# Patient Record
Sex: FEMALE | Race: WHITE | NOT HISPANIC OR LATINO | URBAN - METROPOLITAN AREA
[De-identification: names, ages, dates, MRNs, and addresses within clinical notes are randomized per-mention and may not be internally consistent; named-entity substitution may affect disease eponyms.]

---

## 2020-02-28 ENCOUNTER — OFFICE VISIT (OUTPATIENT)
Dept: FAMILY MEDICINE | Facility: CLINIC | Age: 30
End: 2020-02-28

## 2020-02-28 VITALS
SYSTOLIC BLOOD PRESSURE: 124 MMHG | TEMPERATURE: 97.5 F | WEIGHT: 130 LBS | HEART RATE: 86 BPM | OXYGEN SATURATION: 99 % | HEIGHT: 67 IN | BODY MASS INDEX: 20.4 KG/M2 | DIASTOLIC BLOOD PRESSURE: 81 MMHG

## 2020-02-28 DIAGNOSIS — R35.0 INCREASED FREQUENCY OF URINATION: ICD-10-CM

## 2020-02-28 DIAGNOSIS — B96.89 BV (BACTERIAL VAGINOSIS): ICD-10-CM

## 2020-02-28 DIAGNOSIS — N76.0 BV (BACTERIAL VAGINOSIS): ICD-10-CM

## 2020-02-28 DIAGNOSIS — R30.0 DYSURIA: Primary | ICD-10-CM

## 2020-02-28 LAB
ALBUMIN UR-MCNC: NEGATIVE MG/DL
APPEARANCE UR: CLEAR
BACTERIA #/AREA URNS HPF: ABNORMAL /HPF
BILIRUB UR QL STRIP: NEGATIVE
COLOR UR AUTO: YELLOW
GLUCOSE UR STRIP-MCNC: NEGATIVE MG/DL
HGB UR QL STRIP: NEGATIVE
KETONES UR STRIP-MCNC: NEGATIVE MG/DL
LEUKOCYTE ESTERASE UR QL STRIP: NEGATIVE
NITRATE UR QL: NEGATIVE
NON-SQ EPI CELLS #/AREA URNS LPF: ABNORMAL /LPF
PH UR STRIP: 5.5 PH (ref 5–7)
RBC #/AREA URNS AUTO: ABNORMAL /HPF
SOURCE: ABNORMAL
SP GR UR STRIP: >1.03 (ref 1–1.03)
SPECIMEN SOURCE: ABNORMAL
UROBILINOGEN UR STRIP-ACNC: 0.2 EU/DL (ref 0.2–1)
WBC #/AREA URNS AUTO: ABNORMAL /HPF
WET PREP SPEC: ABNORMAL

## 2020-02-28 PROCEDURE — 99203 OFFICE O/P NEW LOW 30 MIN: CPT | Performed by: NURSE PRACTITIONER

## 2020-02-28 PROCEDURE — 81001 URINALYSIS AUTO W/SCOPE: CPT | Performed by: NURSE PRACTITIONER

## 2020-02-28 PROCEDURE — 87210 SMEAR WET MOUNT SALINE/INK: CPT | Performed by: NURSE PRACTITIONER

## 2020-02-28 RX ORDER — BUPROPION HYDROCHLORIDE 300 MG/1
TABLET ORAL
COMMUNITY
Start: 2020-02-07 | End: 2022-04-12

## 2020-02-28 RX ORDER — LAMOTRIGINE 150 MG/1
TABLET ORAL
COMMUNITY
Start: 2020-02-07 | End: 2022-04-12

## 2020-02-28 RX ORDER — METRONIDAZOLE 7.5 MG/G
GEL VAGINAL
Qty: 30 G | Refills: 0 | Status: SHIPPED | OUTPATIENT
Start: 2020-02-28 | End: 2022-04-12

## 2020-02-28 RX ORDER — METRONIDAZOLE 500 MG/1
500 TABLET ORAL 2 TIMES DAILY
Qty: 14 TABLET | Refills: 0 | Status: SHIPPED | OUTPATIENT
Start: 2020-02-28 | End: 2020-03-06

## 2020-02-28 ASSESSMENT — MIFFLIN-ST. JEOR: SCORE: 1334.37

## 2020-02-28 NOTE — LETTER
[address]  [address]  [place]  [address]  Tel: [phone]    February 28, 2020    Mariana Olguin  [address] [facility] [address]        Dear Ms. Olguin,    Mariana , these are the results we discussed today.    If you have any further questions or problems, please contact our office.      Sincerely,    Omayra Dinh NP/ Denise Salazar, CMA  Results for orders placed or performed in visit on 02/28/20   UA with Microscopic reflex to Culture     Status: Abnormal   Result Value Ref Range    Color Urine Yellow     Appearance Urine Clear     Glucose Urine Negative NEG^Negative mg/dL    Bilirubin Urine Negative NEG^Negative    Ketones Urine Negative NEG^Negative mg/dL    Specific Gravity Urine >1.030 1.003 - 1.035    pH Urine 5.5 5.0 - 7.0 pH    Protein Albumin Urine Negative NEG^Negative mg/dL    Urobilinogen Urine 0.2 0.2 - 1.0 EU/dL    Nitrite Urine Negative NEG^Negative    Blood Urine Negative NEG^Negative    Leukocyte Esterase Urine Negative NEG^Negative    Source Midstream Urine     WBC Urine 0 - 5 OTO5^0 - 5 /HPF    RBC Urine O - 2 OTO2^O - 2 /HPF    Squamous Epithelial /LPF Urine Moderate (A) FEW^Few /LPF    Bacteria Urine Few (A) NEG^Negative /HPF   Wet prep     Status: Abnormal   Result Value Ref Range    Specimen Description Vagina     Wet Prep No Trichomonas seen     Wet Prep No yeast seen     Wet Prep Rare  Clue cells seen   (A)     Wet Prep No WBC's seen                Enclosure: Lab Results

## 2021-03-19 ENCOUNTER — OFFICE VISIT (OUTPATIENT)
Dept: FAMILY MEDICINE | Facility: CLINIC | Age: 31
End: 2021-03-19
Payer: COMMERCIAL

## 2021-03-19 VITALS
HEART RATE: 88 BPM | SYSTOLIC BLOOD PRESSURE: 121 MMHG | WEIGHT: 115.5 LBS | TEMPERATURE: 97.3 F | DIASTOLIC BLOOD PRESSURE: 84 MMHG | BODY MASS INDEX: 18.13 KG/M2 | OXYGEN SATURATION: 98 % | RESPIRATION RATE: 15 BRPM | HEIGHT: 67 IN

## 2021-03-19 DIAGNOSIS — F41.9 ANXIETY AND DEPRESSION: ICD-10-CM

## 2021-03-19 DIAGNOSIS — R19.7 DIARRHEA, UNSPECIFIED TYPE: ICD-10-CM

## 2021-03-19 DIAGNOSIS — Z97.5 IUD (INTRAUTERINE DEVICE) IN PLACE: ICD-10-CM

## 2021-03-19 DIAGNOSIS — R11.2 NAUSEA AND VOMITING, INTRACTABILITY OF VOMITING NOT SPECIFIED, UNSPECIFIED VOMITING TYPE: ICD-10-CM

## 2021-03-19 DIAGNOSIS — F32.A ANXIETY AND DEPRESSION: ICD-10-CM

## 2021-03-19 DIAGNOSIS — Z00.00 ROUTINE GENERAL MEDICAL EXAMINATION AT A HEALTH CARE FACILITY: Primary | ICD-10-CM

## 2021-03-19 DIAGNOSIS — Z13.220 LIPID SCREENING: ICD-10-CM

## 2021-03-19 LAB
ALBUMIN SERPL-MCNC: 3.6 G/DL (ref 3.3–4.6)
ALP SERPL-CCNC: 54 U/L (ref 40–150)
ALT SERPL-CCNC: 21 U/L (ref 0–50)
AST SERPL-CCNC: 25 U/L (ref 0–45)
BASOPHILS # BLD AUTO: 0.1 10E9/L (ref 0–0.2)
BASOPHILS NFR BLD AUTO: 0.7 %
BILIRUB SERPL-MCNC: 0.7 MG/DL (ref 0.2–1.3)
BUN SERPL-MCNC: 7 MG/DL (ref 5–24)
CALCIUM SERPL-MCNC: 9.5 MG/DL (ref 8.5–10.4)
CHLORIDE SERPLBLD-SCNC: 102 MMOL/L (ref 94–109)
CHOLEST SERPL-MCNC: 201 MG/DL (ref 0–200)
CHOLEST/HDLC SERPL: 3.7 {RATIO} (ref 0–5)
CO2 SERPL-SCNC: 30 MMOL/L (ref 20–32)
CREAT SERPL-MCNC: 0.8 MG/DL (ref 0.6–1.3)
DIFFERENTIAL METHOD BLD: ABNORMAL
EGFR CALCULATED (BLACK REFERENCE): 107.6
EGFR CALCULATED (NON BLACK REFERENCE): 88.9
EOSINOPHIL # BLD AUTO: 0.4 10E9/L (ref 0–0.7)
EOSINOPHIL NFR BLD AUTO: 3.9 %
ERYTHROCYTE [DISTWIDTH] IN BLOOD BY AUTOMATED COUNT: 12.3 % (ref 10–15)
FASTING SPECIMEN: NO
GLUCOSE SERPL-MCNC: 90 MG/DL (ref 60–99)
HCT VFR BLD AUTO: 44.2 % (ref 35–47)
HDLC SERPL-MCNC: 55 MG/DL
HGB BLD-MCNC: 15 G/DL (ref 11.7–15.7)
IMM GRANULOCYTES # BLD: 0.1 10E9/L (ref 0–0.4)
IMM GRANULOCYTES NFR BLD: 0.7 %
LDLC SERPL CALC-MCNC: 133 MG/DL (ref 0–129)
LYMPHOCYTES # BLD AUTO: 3.2 10E9/L (ref 0.8–5.3)
LYMPHOCYTES NFR BLD AUTO: 29.9 %
MCH RBC QN AUTO: 33.1 PG (ref 26.5–33)
MCHC RBC AUTO-ENTMCNC: 33.9 G/DL (ref 31.5–36.5)
MCV RBC AUTO: 98 FL (ref 78–100)
MONOCYTES # BLD AUTO: 0.7 10E9/L (ref 0–1.3)
MONOCYTES NFR BLD AUTO: 6.5 %
NEUTROPHILS # BLD AUTO: 6.2 10E9/L (ref 1.6–8.3)
NEUTROPHILS NFR BLD AUTO: 58.3 %
NRBC # BLD AUTO: 0 10*3/UL
NRBC BLD AUTO-RTO: 0 /100
PLATELET # BLD AUTO: 435 10E9/L (ref 150–450)
POTASSIUM SERPL-SCNC: 4.7 MMOL/L (ref 3.4–5.3)
PROT SERPL-MCNC: 7.1 G/DL (ref 6.8–8.8)
RBC # BLD AUTO: 4.53 10E12/L (ref 3.8–5.2)
SODIUM SERPL-SCNC: 143 MMOL/L (ref 137.3–146.3)
TRIGL SERPL-MCNC: 66 MG/DL (ref 0–150)
VLDL-CHOLESTEROL: 13 (ref 7–32)
WBC # BLD AUTO: 10.6 10E9/L (ref 4–11)

## 2021-03-19 RX ORDER — LAMOTRIGINE 150 MG/1
150 TABLET ORAL DAILY
COMMUNITY

## 2021-03-19 RX ORDER — BUPROPION HYDROCHLORIDE 300 MG/1
300 TABLET ORAL EVERY MORNING
COMMUNITY

## 2021-03-19 SDOH — HEALTH STABILITY: MENTAL HEALTH: HOW OFTEN DO YOU HAVE A DRINK CONTAINING ALCOHOL?: 2-4 TIMES A MONTH

## 2021-03-19 SDOH — HEALTH STABILITY: MENTAL HEALTH: HOW MANY STANDARD DRINKS CONTAINING ALCOHOL DO YOU HAVE ON A TYPICAL DAY?: 1 OR 2

## 2021-03-19 SDOH — HEALTH STABILITY: MENTAL HEALTH: HOW OFTEN DO YOU HAVE 6 OR MORE DRINKS ON ONE OCCASION?: NOT ASKED

## 2021-03-19 ASSESSMENT — PATIENT HEALTH QUESTIONNAIRE - PHQ9
5. POOR APPETITE OR OVEREATING: NOT AT ALL
SUM OF ALL RESPONSES TO PHQ QUESTIONS 1-9: 3

## 2021-03-19 ASSESSMENT — ANXIETY QUESTIONNAIRES
3. WORRYING TOO MUCH ABOUT DIFFERENT THINGS: NOT AT ALL
1. FEELING NERVOUS, ANXIOUS, OR ON EDGE: SEVERAL DAYS
7. FEELING AFRAID AS IF SOMETHING AWFUL MIGHT HAPPEN: NOT AT ALL
5. BEING SO RESTLESS THAT IT IS HARD TO SIT STILL: NOT AT ALL
GAD7 TOTAL SCORE: 2
2. NOT BEING ABLE TO STOP OR CONTROL WORRYING: SEVERAL DAYS
6. BECOMING EASILY ANNOYED OR IRRITABLE: NOT AT ALL
IF YOU CHECKED OFF ANY PROBLEMS ON THIS QUESTIONNAIRE, HOW DIFFICULT HAVE THESE PROBLEMS MADE IT FOR YOU TO DO YOUR WORK, TAKE CARE OF THINGS AT HOME, OR GET ALONG WITH OTHER PEOPLE: SOMEWHAT DIFFICULT

## 2021-03-19 ASSESSMENT — MIFFLIN-ST. JEOR: SCORE: 1266.03

## 2021-03-19 NOTE — LETTER
March 23, 2021      Mariana Olguin  [facility] [address]        Angela Peña,     Here are the results from your recent labs in clinic. Everything looks good at this point. I have no concerns about your results. Please feel free to contact the clinic or you can reach me more directly through the [employer] portal if you have any questions.     Loco Schroeder MD     Resulted Orders   Lipid Panel ([place])   Result Value Ref Range    FASTING SPECIMEN NO     Cholesterol 201.0 (H) 0.0 - 200.0    HDL Cholesterol 55.0 >50.0    Triglycerides 66.0 0.0 - 150.0    Cholesterol/HDL Ratio 3.7 0.0 - 5.0    LDL Cholesterol Direct 133.0 (H) 0.0 - 129.0    VLDL-Cholesterol 13.0 7.0 - 32.0   Hepatitis C Screen Reflex to HCV RNA Quant and Genotype   Result Value Ref Range    Hepatitis C Antibody Nonreactive NR^Nonreactive      Comment:      Assay performance characteristics have not been established for newborns,   infants, and children     Comprehensive Metabolic Panel ([place])   Result Value Ref Range    Glucose 90.0 60.0 - 99.0 mg/dL    Urea Nitrogen 7.0 5.0 - 24.0 mg/dL    Calcium 9.5 8.5 - 10.4 mg/dL    Creatinine 0.8 0.6 - 1.3 mg/dL    eGFR Calculated (Non Black Reference) 88.9 >60.0    eGFR Calculated (Black Reference) 107.6 >60.0    Sodium 143.0 137.3 - 146.3 mmol/L    Potassium 4.7 3.4 - 5.3 mmol/L    Chloride 102.0 94.0 - 109.0 mmol/L    Carbon Dioxide 30.0 20.0 - 32.0 mmol/L    Albumin 3.6 3.3 - 4.6 g/dL    Alkaline Phosphatase 54.0 40.0 - 150.0 U/L    ALT 21.0 0.0 - 50.0 U/L    AST 25.0 0.0 - 45.0 U/L    Bilirubin Total 0.7 0.2 - 1.3 mg/dL    Protein Total 7.1 6.8 - 8.8 g/dL   HIV Antigen Antibody Combo   Result Value Ref Range    HIV Antigen Antibody Combo Nonreactive NR^Nonreactive          Comment:      HIV-1 p24 Ag & HIV-1/HIV-2 Ab Not Detected   CBC with platelets differential   Result Value Ref Range    WBC 10.6 4.0 - 11.0 10e9/L    RBC Count 4.53 3.8 - 5.2 10e12/L    Hemoglobin 15.0 11.7 -  15.7 g/dL    Hematocrit 44.2 35.0 - 47.0 %    MCV 98 78 - 100 fl    MCH 33.1 (H) 26.5 - 33.0 pg    MCHC 33.9 31.5 - 36.5 g/dL    RDW 12.3 10.0 - 15.0 %    Platelet Count 435 150 - 450 10e9/L    Diff Method Automated Method     % Neutrophils 58.3 %    % Lymphocytes 29.9 %    % Monocytes 6.5 %    % Eosinophils 3.9 %    % Basophils 0.7 %    % Immature Granulocytes 0.7 %    Nucleated RBCs 0 0 /100    Absolute Neutrophil 6.2 1.6 - 8.3 10e9/L    Absolute Lymphocytes 3.2 0.8 - 5.3 10e9/L    Absolute Monocytes 0.7 0.0 - 1.3 10e9/L    Absolute Eosinophils 0.4 0.0 - 0.7 10e9/L    Absolute Basophils 0.1 0.0 - 0.2 10e9/L    Abs Immature Granulocytes 0.1 0 - 0.4 10e9/L    Absolute Nucleated RBC 0.0       No

## 2021-03-19 NOTE — NURSING NOTE
"31 year old  Chief Complaint   Patient presents with     Establish Care     Abdominal Pain     stomach issue, x 6 months,  intermittent weeks of diarrhea, thinks lost about 10 lb, last week vomitting/diarrhea/abd cramping       Blood pressure 121/84, pulse 88, temperature 97.3  F (36.3  C), temperature source Skin, resp. rate 15, height 1.693 m (5' 6.65\"), weight 52.4 kg (115 lb 8 oz), SpO2 98 %. Body mass index is 18.28 kg/m .  There is no problem list on file for this patient.      Wt Readings from Last 2 Encounters:   03/19/21 52.4 kg (115 lb 8 oz)     BP Readings from Last 3 Encounters:   03/19/21 121/84         Current Outpatient Medications   Medication     buPROPion (WELLBUTRIN XL) 300 MG 24 hr tablet     lamoTRIgine (LAMICTAL) 150 MG tablet     levonorgestrel (MIRENA) 20 MCG/24HR IUD     No current facility-administered medications for this visit.        Social History     Tobacco Use     Smoking status: Never Smoker     Smokeless tobacco: Never Used   Substance Use Topics     Alcohol use: Yes     Frequency: 2-4 times a month     Drinks per session: 1 or 2     Drug use: Never       Health Maintenance Due   Topic Date Due     PREVENTIVE CARE VISIT  Never done     ADVANCE CARE PLANNING  Never done     HIV SCREENING  Never done     HEPATITIS C SCREENING  Never done     PAP  Never done     DTAP/TDAP/TD IMMUNIZATION (1 - Tdap) Never done     INFLUENZA VACCINE (1) 09/01/2020       No results found for: PAP      March 19, 2021 1:40 PM    "

## 2021-03-19 NOTE — PROGRESS NOTES
"   SUBJECTIVE:   CC: Mariana Olguin is an 31 year old woman who presents for preventive health visit.     Patient has been advised of split billing requirements and indicates understanding: Yes     Healthy Habits:    Do you get at least three servings of calcium containing foods daily (dairy, green leafy vegetables, etc.)? no, taking calcium and/or vitamin D supplement: yes     Amount of exercise or daily activities, outside of work: 3-4 day(s) per week    Problems taking medications regularly No    Medication side effects: No    Have you had an eye exam in the past two years? yes    Do you see a dentist twice per year? yes    Do you have sleep apnea, excessive snoring or daytime drowsiness?no      PROBLEMS TO ADD ON...  Abdominal discomfort  - off and on for the past 6 months  - diarrhea, cramping would occur for a couple of weeks  - then spontaneously resolve  - more recently episodes are more brief but more frequent, gaining in intensity  - now with associated vomiting  - believes she has lost about 10 pounds without trying over the past 6 months or so.   - says she has a \"consistent diet\" with lots of breads and dairy so she has a very low suspicion for lactose or gluten intolerance (why would symptoms come and go if I'm always eating the same thing?)  - patient does have a history of depression and anxiety (she mentions \"bipolar\") for which she takes Wellbutrin and Lamictal as prescribed by a psychiatrist; she denies any recent med changes or dose adjustments  - denies any fever or chills  - denies any blood in stool  - no urinary issues  - has an IUD in place she thinks \"for about a year now\"  - denies identifiable association with menstruation    Today's PHQ-2 Score:   PHQ-2 ( 1999 [employer]) 3/19/2021   Q1: Little interest or pleasure in doing things 0   Q2: Feeling down, depressed or hopeless 0   PHQ-2 Score 0       Abuse: Current or Past(Physical, Sexual or Emotional)- No  Do you feel safe in your " environment? Yes      Social History     Tobacco Use     Smoking status: Never Smoker     Smokeless tobacco: Never Used   Substance Use Topics     Alcohol use: Yes     Frequency: 2-4 times a month     Drinks per session: 1 or 2     If you drink alcohol do you typically have >3 drinks per day or >7 drinks per week? No                     Reviewed orders with patient.  Reviewed health maintenance and updated orders accordingly - Yes    Breast CA Risk Screening:  Patient under 40 years of age: Routine Mammogram Screening not recommended.     Pertinent mammograms are reviewed under the imaging tab.  History of abnormal Pap smear: NO - age 30-65 PAP every 5 years with negative HPV co-testing recommended     Reviewed and updated as needed this visit by clinical staff  Tobacco  Allergies  Meds  Problems  Med Hx  Surg Hx  Fam Hx          Reviewed and updated as needed this visit by Provider  Tobacco  Allergies  Meds  Problems  Med Hx  Surg Hx  Fam Hx         History reviewed. No pertinent past medical history.   Past Surgical History:   Procedure Laterality Date     DENTAL SURGERY      [place] Teeth       ROS:  CONSTITUTIONAL: NEGATIVE for fever, chills, change in weight  INTEGUMENTARU/SKIN: NEGATIVE for worrisome rashes, moles or lesions  EYES: NEGATIVE for vision changes or irritation  ENT: NEGATIVE for ear, mouth and throat problems  RESP: NEGATIVE for significant cough or SOB  BREAST: NEGATIVE for masses, tenderness or discharge  CV: NEGATIVE for chest pain, palpitations or peripheral edema  GI: NEGATIVE for nausea, abdominal pain, heartburn, or change in bowel habits  : NEGATIVE for unusual urinary or vaginal symptoms. Periods are regular.  MUSCULOSKELETAL: NEGATIVE for significant arthralgias or myalgia  NEURO: NEGATIVE for weakness, dizziness or paresthesias  PSYCHIATRIC: NEGATIVE for changes in mood or affect    OBJECTIVE:   /84 (BP Location: Right arm, Patient Position: Sitting, Cuff Size:  "Adult Regular)   Pulse 88   Temp 97.3  F (36.3  C) (Skin)   Resp 15   Ht 1.693 m (5' 6.65\")   Wt 52.4 kg (115 lb 8 oz)   SpO2 98%   BMI 18.28 kg/m    EXAM:  GENERAL: healthy, alert and no distress  EYES: Eyes grossly normal to inspection, PERRL and conjunctivae and sclerae normal  HENT: ear canals and TM's normal, nose and mouth without ulcers or lesions  NECK: no adenopathy, no asymmetry, masses, or scars and thyroid normal to palpation  RESP: lungs clear to auscultation - no rales, rhonchi or wheezes  BREAST: normal without masses, tenderness or nipple discharge and no palpable axillary masses or adenopathy  CV: regular rate and rhythm, normal S1 S2, no S3 or S4, no murmur, click or rub, no peripheral edema and peripheral pulses strong  ABDOMEN: soft, moderate generalized tenderness, no hepatosplenomegaly, no masses and bowel sounds normal  MS: no gross musculoskeletal defects noted, no edema  SKIN: no suspicious lesions or rashes  NEURO: Normal strength and tone, mentation intact and speech normal  PSYCH: mentation appears normal, affect normal/bright    Diagnostic Test Results:  Labs reviewed in Epic    ASSESSMENT/PLAN:   Mariana was seen today for establish care and abdominal pain.    Diagnoses and all orders for this visit:    Routine general medical examination at a health care facility  -     Hepatitis C Screen Reflex to HCV RNA Quant and Genotype  -     Comprehensive Metabolic Panel ([place])  -     HIV Antigen Antibody Combo    Lipid screening  -     Lipid Panel ([place])    Anxiety and depression    IUD (intrauterine device) in place    Nausea and vomiting, intractability of vomiting not specified, unspecified vomiting type  -     GASTROENTEROLOGY ADULT REF CONSULT ONLY; Future    Diarrhea, unspecified type  -     GASTROENTEROLOGY ADULT REF CONSULT ONLY; Future  -     Cancel: CBC with Diff Plt ([facility])  -     CBC with platelets differential    Unclear diagnosis for abdominal symptoms at this " "point. Considered stool testing for parasites or bacteria but I have low suspicion for this based on history. Could consider this if symptoms worsen. Some consideration for IBD although reassured by the absence of blood in stool. Possible food allergies causing episodic inflammation but patient does not believe this to be an option. She does have a significant history for anxiety and mood disorders and I have some suspicion for IBS factor, although at this point patient feels she is managing her mood very well and does not believe this could be related to anxiety. She very much wishes to follow up with Gastroenterology at this point which I do not think is unreasonable.     Patient has been advised of split billing requirements and indicates understanding: Yes  COUNSELING:   Reviewed preventive health counseling, as reflected in patient instructions    Estimated body mass index is 18.28 kg/m  as calculated from the following:    Height as of this encounter: 1.693 m (5' 6.65\").    Weight as of this encounter: 52.4 kg (115 lb 8 oz).        She reports that she has never smoked. She has never used smokeless tobacco.      Counseling Resources:  ATP IV Guidelines  Pooled Cohorts Equation Calculator  Breast Cancer Risk Calculator  BRCA-Related Cancer Risk Assessment: FHS-7 Tool  FRAX Risk Assessment  ICSI Preventive Guidelines  Dietary Guidelines for Americans, 2010  USDA's MyPlate  ASA Prophylaxis  Lung CA Screening    Loco Puckett MD  [facility]  "

## 2021-03-20 ASSESSMENT — ANXIETY QUESTIONNAIRES: GAD7 TOTAL SCORE: 2

## 2021-03-22 ENCOUNTER — TELEPHONE (OUTPATIENT)
Dept: GASTROENTEROLOGY | Facility: CLINIC | Age: 31
End: 2021-03-22

## 2021-03-22 LAB
HCV AB SERPL QL IA: NONREACTIVE
HIV 1+2 AB+HIV1 P24 AG SERPL QL IA: NONREACTIVE

## 2021-03-22 NOTE — TELEPHONE ENCOUNTER
[facility] Call Center    Phone Message    May a detailed message be left on voicemail: yes     Reason for Call: Other: Per pt has a referral sent over and pt is loosing weight fast. Per pt is currently 115. Writer sending this encounter over for clinic for a patient assessment. Please call pt back. Thank you!     Action Taken: Message routed to:  [facility] ([facility] Gastro    Travel Screening: Not Applicable

## 2021-04-15 ENCOUNTER — TRANSFERRED RECORDS (OUTPATIENT)
Dept: HEALTH INFORMATION MANAGEMENT | Facility: CLINIC | Age: 31
End: 2021-04-15

## 2021-04-15 LAB — COLONOSCOPY: NORMAL

## 2021-04-23 ENCOUNTER — TRANSFERRED RECORDS (OUTPATIENT)
Dept: HEALTH INFORMATION MANAGEMENT | Facility: CLINIC | Age: 31
End: 2021-04-23

## 2021-05-11 ENCOUNTER — PRE VISIT (OUTPATIENT)
Dept: GASTROENTEROLOGY | Facility: CLINIC | Age: 31
End: 2021-05-11

## 2021-05-11 ENCOUNTER — VIRTUAL VISIT (OUTPATIENT)
Dept: GASTROENTEROLOGY | Facility: CLINIC | Age: 31
End: 2021-05-11
Attending: FAMILY MEDICINE
Payer: COMMERCIAL

## 2021-05-11 VITALS — HEIGHT: 67 IN | WEIGHT: 122 LBS | BODY MASS INDEX: 19.15 KG/M2

## 2021-05-11 DIAGNOSIS — R10.84 ABDOMINAL PAIN, GENERALIZED: Primary | ICD-10-CM

## 2021-05-11 DIAGNOSIS — R19.7 DIARRHEA, UNSPECIFIED TYPE: ICD-10-CM

## 2021-05-11 PROCEDURE — 99204 OFFICE O/P NEW MOD 45 MIN: CPT | Mod: GC | Performed by: STUDENT IN AN ORGANIZED HEALTH CARE EDUCATION/TRAINING PROGRAM

## 2021-05-11 RX ORDER — PANTOPRAZOLE SODIUM 40 MG/1
40 TABLET, DELAYED RELEASE ORAL DAILY
Qty: 30 TABLET | Refills: 0 | Status: SHIPPED | OUTPATIENT
Start: 2021-05-11 | End: 2021-06-10

## 2021-05-11 ASSESSMENT — MIFFLIN-ST. JEOR: SCORE: 1293.08

## 2021-05-11 NOTE — PROGRESS NOTES
Mariana Olguin is a 31 year old old who is being evaluated via a billable video visit.      How would you like to obtain your AVS? MyChart  If the video visit is dropped, the invitation should be resent by: Text to cell phone: [phone]  Will anyone else be joining your video visit? No      Kimi Marquez, [facility]  Surgery Clinic

## 2021-05-11 NOTE — PATIENT INSTRUCTIONS
It was a pleasure taking care of you today.  I've included a brief summary of our discussion and care plan from today's visit below.  Please review this information with your primary care provider.  _______________________________________________________________________    My recommendations are summarized as follows:    - We will work to obtain the records from [facility]  - Continue pantoprazole daily for another month  - Continue fiber supplemenation  - Continue NSAID avoidance  - Agree with holding on budesonide for now as you are no longer having diarrhea    Return to GI Clinic as needed   _______________________________________________________________________    Who do I call with any questions after my visit?  Please be in touch if there are any further questions that arise following today's visit.  There are multiple ways to contact your gastroenterology care team.        During business hours, you may reach a Gastroenterology nurse at [phone] and choose option 3.         To schedule or reschedule an appointment, please call [phone].       You can always send a secure message through Topicmarks.  Topicmarks messages are answered by your nurse or doctor typically within 24 hours.  Please allow extra time on weekends and holidays.        For urgent/emergent questions after business hours, you may reach the on-call GI Fellow by contacting the [facility]  at [phone].     How will I get the results of any tests ordered?    You will receive all of your results.  If you have signed up for Topicmarks, any tests ordered at your visit will be available to you after your physician reviews them.  Typically this takes 1-2 weeks.  If there are urgent results that require a change in your care plan, your physician or nurse will call you to discuss the next steps.      What is Topicmarks?  Topicmarks is a secure way for you to access all of your healthcare records from the [facility].  It is a  web based computer program, so you can sign on to it from any location.  It also allows you to send secure messages to your care team.  I recommend signing up for Aceable access if you have not already done so and are comfortable with using a computer.      How to I schedule a follow-up visit?  If you did not schedule a follow-up visit today, please call [phone] to schedule a follow-up office visit.        Sincerely,    Cash Feliciano MD  Fellow  [facility]  Division of Gastroenterology

## 2021-05-11 NOTE — PROGRESS NOTES
Video Start time: 418PM   Video End time: 454PM    GI CLINIC VISIT - NEW PATIENT    CC/REFERRING PROVIDER: Loco Puckett  REASON FOR CONSULTATION: abdominal pain and diarrhea    HPI: 31 year old female with PMH of anxiety/depression, bipolar, presenting to GI clinic for abdominal pain and diarrhea.    Reports off/on stomach issues for the past year, worsened 2 months ago with worsened diarrhea and postprandial abdominal cramping. Was having watery diarrhea, predominantly in AM for a couple hours, then improved during day. Stopped eating so as not to trigger diarrhea. On days with diarrhea, would have up to 5-6 BMs/day. On good days, would have at most 2 BMs/day, formed. No obvious black/bloody stools. Had lost weight, was down to 114 lbs, now back up to 122 lbs. Was eating bland diet. Was taking Advil and aspirin in the recent past, up to 2x/day, now stopped after recent EGD/colonoscopy.      Saw PCP 3/19/2021 with abdominal pain, diarrhea, weight loss with normal CBC, negative HIV, normal CMP.    Went to [facility] - had EGD and colonoscopy. EGD with erythematous mucosa in stomach, otherwise normal. Colon with ulcers in sigmoid colon, possible resolving ischemic colitis. Biopsies returned and showed microscopic colitis. Duodenal biopsies were negative for celiac disease.     Has been taking pantoprazole in AM which has been significantly helping, has been taking for 1 month.   Also taking metamucil with every meal which is helping with abdominal cramping, worse cramping without this.   Went to follow up visit with Emi and was given budesonide, didn't start taking as was worried this may affect her mood after talking with psychiatrist and with GI.   Also given dicyclomine, hasn't needed since not having abdominal pain.     Currently, doing well, only will notice sensitivity to spicy foods and citrus foods and have some GERD with that. Bowel movements have been improved as well over past month, having 2-3 formed  BMs/day.     ROS: 10pt ROS performed and otherwise negative.    PAST MEDICAL HISTORY:  Anxiety  Depression   Bipolar    PREVIOUS ABDOMINAL/GYNECOLOGIC SURGERIES:  Past Surgical History:   Procedure Laterality Date     DENTAL SURGERY      [place] Teeth     PERTINENT MEDICATIONS:  Current Outpatient Medications   Medication Sig Dispense Refill     buPROPion (WELLBUTRIN XL) 300 MG 24 hr tablet Take 300 mg by mouth every morning       buPROPion (WELLBUTRIN XL) 300 MG 24 hr tablet TK 1 T PO QD       lamoTRIgine (LAMICTAL) 150 MG tablet Take 150 mg by mouth daily       lamoTRIgine (LAMICTAL) 150 MG tablet TK 1 T PO D       levonorgestrel (MIRENA) 20 MCG/24HR IUD 1 each by Intrauterine route once       metroNIDAZOLE (METROGEL) 0.75 % vaginal gel Insert one applicatorful nightly for 7 nights 30 g 0     SOCIAL HISTORY:  Smoking: None  EtOH: Minimal EtOH use  Social History     Socioeconomic History     Marital status: Single     Spouse name: Not on file     Number of children: Not on file     Years of education: Not on file     Highest education level: Not on file   Occupational History     Not on file   Social Needs     Financial resource strain: Not on file     Food insecurity     Worry: Not on file     Inability: Not on file     Transportation needs     Medical: Not on file     Non-medical: Not on file   Tobacco Use     Smoking status: Never Smoker     Smokeless tobacco: Never Used   Substance and Sexual Activity     Alcohol use: Yes     Frequency: 2-4 times a month     Drinks per session: 1 or 2     Drug use: Never     Sexual activity: Yes     Partners: Male     Birth control/protection: I.U.D.   Lifestyle     Physical activity     Days per week: Not on file     Minutes per session: Not on file     Stress: Not on file   Relationships     Social connections     Talks on phone: Not on file     Gets together: Not on file     Attends Christian service: Not on file     Active member of club or organization: Not on file      Attends meetings of clubs or organizations: Not on file     Relationship status: Not on file     Intimate partner violence     Fear of current or ex partner: Not on file     Emotionally abused: Not on file     Physically abused: Not on file     Forced sexual activity: Not on file   Other Topics Concern     Not on file   Social History Narrative    ** Merged History Encounter **          FAMILY HISTORY:  No colon cancer  Family History   Problem Relation Age of Onset     Hypertension Father      Breast Cancer Paternal Grandmother      PHYSICAL EXAMINATION:  Video physical exam  General: Patient appears well in no acute distress.   Skin: No visualized rash or lesions on visualized skin  Eyes: EOMI, no erythema, sclera icterus or discharge noted  Resp: Appears to be breathing comfortably without accessory muscle usage, speaking in full sentences, no cough  MSK: Appears to have normal range of motion based on visualized movements  Neurologic: No apparent tremors, facial movements symmetric  Psych: affect normal, alert and oriented    The rest of a comprehensive physical examination is deferred due to PHE (public health emergency) video restrictions    PERTINENT STUDIES Reviewed in EMR    ASSESSMENT/PLAN:  31 year old female with PMH of anxiety/depression, bipolar, presenting to GI clinic for abdominal pain and diarrhea. Pain and diarrhea worsened 2 months ago, largely postprandial, in setting of NSAID use. EGD/colon with erythematous mucosa in stomach, sigmoid ulcers and biopsies with MC, all of which may have been triggered by NSAID use. Now improving with NSAID cessation, PPI, and fiber supplementation. May be a component of superimposed IBS as well. Reportedly negative biopsies for celiac.     Plan:  - Obtain records from [facility] from EGD/colon, pathology  - Continue PPI daily for total of 2 months, then trial off PPI  - Continue fiber supplemenation  - Continue NSAID avoidance  - Agree with holding on budesonide  for now as she is no longer having diarrhea    RTC PRN    Thank you for this consultation. It was a pleasure to participate in the care of this patient; please contact us with any further questions.    Seen and discussed with Dr. Perez    This note was created with voice recognition software, and while reviewed for accuracy, typos may remain.     Cash Feliciano MD  GI Fellow  p [phone]

## 2021-05-11 NOTE — LETTER
5/11/2021         RE: Mariana Olguin  [address] [facility] [address]        Dear Colleague,    Thank you for referring your patient, Mariana Olguin, to the [facility] [place]. Please see a copy of my visit note below.    Video Start time: 418PM   Video End time: 454PM    GI CLINIC VISIT - NEW PATIENT    CC/REFERRING PROVIDER: Loco Puckett  REASON FOR CONSULTATION: abdominal pain and diarrhea    HPI: 31 year old female with PMH of anxiety/depression, bipolar, presenting to GI clinic for abdominal pain and diarrhea.    Reports off/on stomach issues for the past year, worsened 2 months ago with worsened diarrhea and postprandial abdominal cramping. Was having watery diarrhea, predominantly in AM for a couple hours, then improved during day. Stopped eating so as not to trigger diarrhea. On days with diarrhea, would have up to 5-6 BMs/day. On good days, would have at most 2 BMs/day, formed. No obvious black/bloody stools. Had lost weight, was down to 114 lbs, now back up to 122 lbs. Was eating bland diet. Was taking Advil and aspirin in the recent past, up to 2x/day, now stopped after recent EGD/colonoscopy.      Saw PCP 3/19/2021 with abdominal pain, diarrhea, weight loss with normal CBC, negative HIV, normal CMP.    Went to [facility] - had EGD and colonoscopy. EGD with erythematous mucosa in stomach, otherwise normal. Colon with ulcers in sigmoid colon, possible resolving ischemic colitis. Biopsies returned and showed microscopic colitis. Duodenal biopsies were negative for celiac disease.     Has been taking pantoprazole in AM which has been significantly helping, has been taking for 1 month.   Also taking metamucil with every meal which is helping with abdominal cramping, worse cramping without this.   Went to follow up visit with [facility] and was given budesonide, didn't start taking as was worried this may affect her mood after talking with psychiatrist and with GI.    Also given dicyclomine, hasn't needed since not having abdominal pain.     Currently, doing well, only will notice sensitivity to spicy foods and citrus foods and have some GERD with that. Bowel movements have been improved as well over past month, having 2-3 formed BMs/day.     ROS: 10pt ROS performed and otherwise negative.    PAST MEDICAL HISTORY:  Anxiety  Depression   Bipolar    PREVIOUS ABDOMINAL/GYNECOLOGIC SURGERIES:  Past Surgical History:   Procedure Laterality Date     DENTAL SURGERY      [place] Teeth     PERTINENT MEDICATIONS:  Current Outpatient Medications   Medication Sig Dispense Refill     buPROPion (WELLBUTRIN XL) 300 MG 24 hr tablet Take 300 mg by mouth every morning       buPROPion (WELLBUTRIN XL) 300 MG 24 hr tablet TK 1 T PO QD       lamoTRIgine (LAMICTAL) 150 MG tablet Take 150 mg by mouth daily       lamoTRIgine (LAMICTAL) 150 MG tablet TK 1 T PO D       levonorgestrel (MIRENA) 20 MCG/24HR IUD 1 each by Intrauterine route once       metroNIDAZOLE (METROGEL) 0.75 % vaginal gel Insert one applicatorful nightly for 7 nights 30 g 0     SOCIAL HISTORY:  Smoking: None  EtOH: Minimal EtOH use  Social History     Socioeconomic History     Marital status: Single     Spouse name: Not on file     Number of children: Not on file     Years of education: Not on file     Highest education level: Not on file   Occupational History     Not on file   Social Needs     Financial resource strain: Not on file     Food insecurity     Worry: Not on file     Inability: Not on file     Transportation needs     Medical: Not on file     Non-medical: Not on file   Tobacco Use     Smoking status: Never Smoker     Smokeless tobacco: Never Used   Substance and Sexual Activity     Alcohol use: Yes     Frequency: 2-4 times a month     Drinks per session: 1 or 2     Drug use: Never     Sexual activity: Yes     Partners: Male     Birth control/protection: I.U.D.   Lifestyle     Physical activity     Days per week: Not on file      Minutes per session: Not on file     Stress: Not on file   Relationships     Social connections     Talks on phone: Not on file     Gets together: Not on file     Attends Latter day service: Not on file     Active member of club or organization: Not on file     Attends meetings of clubs or organizations: Not on file     Relationship status: Not on file     Intimate partner violence     Fear of current or ex partner: Not on file     Emotionally abused: Not on file     Physically abused: Not on file     Forced sexual activity: Not on file   Other Topics Concern     Not on file   Social History Narrative    ** Merged History Encounter **          FAMILY HISTORY:  No colon cancer  Family History   Problem Relation Age of Onset     Hypertension Father      Breast Cancer Paternal Grandmother      PHYSICAL EXAMINATION:  Video physical exam  General: Patient appears well in no acute distress.   Skin: No visualized rash or lesions on visualized skin  Eyes: EOMI, no erythema, sclera icterus or discharge noted  Resp: Appears to be breathing comfortably without accessory muscle usage, speaking in full sentences, no cough  MSK: Appears to have normal range of motion based on visualized movements  Neurologic: No apparent tremors, facial movements symmetric  Psych: affect normal, alert and oriented    The rest of a comprehensive physical examination is deferred due to PHE (public health emergency) video restrictions    PERTINENT STUDIES Reviewed in EMR    ASSESSMENT/PLAN:  31 year old female with PMH of anxiety/depression, bipolar, presenting to GI clinic for abdominal pain and diarrhea. Pain and diarrhea worsened 2 months ago, largely postprandial, in setting of NSAID use. EGD/colon with erythematous mucosa in stomach, sigmoid ulcers and biopsies with MC, all of which may have been triggered by NSAID use. Now improving with NSAID cessation, PPI, and fiber supplementation. May be a component of superimposed IBS as well.  Reportedly negative biopsies for celiac.     Plan:  - Obtain records from [facility] from EGD/colon, pathology  - Continue PPI daily for total of 2 months, then trial off PPI  - Continue fiber supplemenation  - Continue NSAID avoidance  - Agree with holding on budesonide for now as she is no longer having diarrhea    RTC PRN    Thank you for this consultation. It was a pleasure to participate in the care of this patient; please contact us with any further questions.    Seen and discussed with Dr. Perez    This note was created with voice recognition software, and while reviewed for accuracy, typos may remain.     Cash Feliciano MD  GI Fellow  p [phone]      Mariana Olguin is a 31 year old old who is being evaluated via a billable video visit.      How would you like to obtain your AVS? MyChart  If the video visit is dropped, the invitation should be resent by: Text to cell phone: [phone]  Will anyone else be joining your video visit? No      Kimi Marquez, EMT  Surgery Clinic

## 2021-05-11 NOTE — NURSING NOTE
"Chief Complaint   Patient presents with     Consult     Abdominal pain, diarrhea ,and weight loss.       Vitals:    05/11/21 1603   Weight: 122 lb   Height: 5' 6.5\"       Body mass index is 19.4 kg/m .      Kimi Marquez, EMT  Surgery Clinic                      "

## 2021-05-20 ENCOUNTER — DOCUMENTATION ONLY (OUTPATIENT)
Dept: GASTROENTEROLOGY | Facility: CLINIC | Age: 31
End: 2021-05-20

## 2021-05-20 NOTE — PROGRESS NOTES
Action 5/20/2021 8:49am -Thuan   Action Taken Fax request sent to [facility] ([phone]) for med recs.    ** Per Cony WESTFALL RN     2:01pm Received were received from [facility]; sent to scan. A copy was forwarded to Cony's email.

## 2021-05-24 ENCOUNTER — TRANSFERRED RECORDS (OUTPATIENT)
Dept: HEALTH INFORMATION MANAGEMENT | Facility: CLINIC | Age: 31
End: 2021-05-24

## 2021-06-09 DIAGNOSIS — R10.84 ABDOMINAL PAIN, GENERALIZED: ICD-10-CM

## 2021-06-09 NOTE — TELEPHONE ENCOUNTER
pantoprazole (PROTONIX) 40 MG EC tablet      Last Written Prescription Date:  5/11/2021  Last Fill Quantity: 30 tab,   # refills: 0  Last Office Visit : 5/11/2021  Future Office visit:  none    Routing refill request to provider for review/approval because:  Refill needs review- trial dose x 2 months then off to monitor symptoms.

## 2021-07-07 NOTE — PROGRESS NOTES
"GI CLINIC VISIT     CC/REFERRING PROVIDER: Loco Puckett  REASON FOR CONSULTATION: abdominal pain and diarrhea    HPI: 31 year old female with PMH of anxiety/depression, bipolar, presenting to GI clinic for follow-up of abdominal pain and diarrhea.    Mariana presented with a one year history of postprandial abdomianl cramping and diarrhea, with progressive symptoms in Spring 2021.Initially noted watery diarrea lasting several hours, triggered postprandially, with 5-6 BMs/day. This was in the context of daily NSAID use. Saw PCP 3/19/2021 with abdominal pain, diarrhea, weight loss with normal CBC, negative HIV, normal CMP. Went to [facility] - had EGD and colonoscopy. EGD with erythematous mucosa in stomach, otherwise normal. Colon with ulcers in sigmoid colon, possible resolving ischemic colitis. Biopsies returned and showed microscopic colitis. Duodenal biopsies were negative for celiac disease. Given resolution of diarrhea and cessation of NSAID, budesonide was not started. There was also some concern regarding potential effect on mood from her psychiatry team. She started pantoprazole 40 mg in the morning with significant improvement, as well as Metamucil with every meal, with     Has been taking pantoprazole in AM which has been significantly helping, has been taking for 1 month.   Also taking metamucil with every meal which is helping with abdominal cramping, worse cramping without this.   Went to follow up visit with [facility] and was given budesonide, didn't start taking as was worried this may affect her mood after talking with psychiatrist and with GI.   Also given dicyclomine, hasn't needed since not having abdominal pain.     Interval history:  Mariana reports she was doing fairly well for a period of time on Metamucil 3 capsTID and pantoprazole 40 mg twice daily, however was still having mild \"stomach upset\", which she further  describes as abdominal cramping. Several weeks ago, the symptoms worsened again " with more significant abdominal cramping, worsened with spicy foods, citrus, raw vegetables. She regained weight that she previously lost, but now feels like she might be starting to lose weight again. Daily bowel movement, hard, small, dime-sized pieces, associated with abdominal bloating.    ROS: 10pt ROS performed and otherwise negative.    PAST MEDICAL HISTORY:  Anxiety  Depression   Bipolar    PREVIOUS ABDOMINAL/GYNECOLOGIC SURGERIES:  Past Surgical History:   Procedure Laterality Date     DENTAL SURGERY      [place] Teeth     PERTINENT MEDICATIONS:  Current Outpatient Medications   Medication Sig Dispense Refill     buPROPion (WELLBUTRIN XL) 300 MG 24 hr tablet Take 300 mg by mouth every morning       buPROPion (WELLBUTRIN XL) 300 MG 24 hr tablet TK 1 T PO QD       lamoTRIgine (LAMICTAL) 150 MG tablet Take 150 mg by mouth daily       lamoTRIgine (LAMICTAL) 150 MG tablet TK 1 T PO D       levonorgestrel (MIRENA) 20 MCG/24HR IUD 1 each by Intrauterine route once       metroNIDAZOLE (METROGEL) 0.75 % vaginal gel Insert one applicatorful nightly for 7 nights 30 g 0     SOCIAL HISTORY:  Smoking: None  EtOH: Minimal EtOH use  Social History     Socioeconomic History     Marital status: Single     Spouse name: Not on file     Number of children: Not on file     Years of education: Not on file     Highest education level: Not on file   Occupational History     Not on file   Social Needs     Financial resource strain: Not on file     Food insecurity     Worry: Not on file     Inability: Not on file     Transportation needs     Medical: Not on file     Non-medical: Not on file   Tobacco Use     Smoking status: Never Smoker     Smokeless tobacco: Never Used   Substance and Sexual Activity     Alcohol use: Yes     Frequency: 2-4 times a month     Drinks per session: 1 or 2     Drug use: Never     Sexual activity: Yes     Partners: Male     Birth control/protection: I.U.D.   Lifestyle     Physical activity     Days per  week: Not on file     Minutes per session: Not on file     Stress: Not on file   Relationships     Social connections     Talks on phone: Not on file     Gets together: Not on file     Attends Mormonism service: Not on file     Active member of club or organization: Not on file     Attends meetings of clubs or organizations: Not on file     Relationship status: Not on file     Intimate partner violence     Fear of current or ex partner: Not on file     Emotionally abused: Not on file     Physically abused: Not on file     Forced sexual activity: Not on file   Other Topics Concern     Not on file   Social History Narrative    ** Merged History Encounter **          FAMILY HISTORY:  No colon cancer  Family History   Problem Relation Age of Onset     Hypertension Father      Breast Cancer Paternal Grandmother      PHYSICAL EXAMINATION:  Video physical exam  General: Patient appears well in no acute distress.   Skin: No visualized rash or lesions on visualized skin  Eyes: EOMI, no erythema, sclera icterus or discharge noted  Resp: Appears to be breathing comfortably without accessory muscle usage, speaking in full sentences, no cough  MSK: Appears to have normal range of motion based on visualized movements  Neurologic: No apparent tremors, facial movements symmetric  Psych: affect normal, alert and oriented    The rest of a comprehensive physical examination is deferred due to PHE (public health emergency) video restrictions    PERTINENT STUDIES Reviewed in EMR    ASSESSMENT/PLAN:  31 year old female with PMH of anxiety/depression, bipolar, presenting to GI clinic for abdominal pain and diarrhea. Pain and diarrhea worsened 2 months ago, largely postprandial, in setting of NSAID use. EGD/colon with erythematous mucosa in stomach, sigmoid ulcers and biopsies with MC, all of which may have been triggered by NSAID use. Previously improving with NSAID cessation, PPI, and fiber supplementation, however now with recurrence of  abdominal cramping and bloating, in the setting of daily BSC 1 stools.    With resolution of diarrhea, microscopic colitis is unlikely the underlying etiology for her symptoms, though could be possible. We can check a fecal calprotectin, knowing that the pantoprazole may falsely elevate this value. If negative, would provide reassurance that symptoms are likely unrelated to previous finding of microscopic colitis while on NSAIDs. 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 She prefers to avoid budesonide if possible.    Plan:  - Continue pantoprazole 40 mg twice daily 30-60 minutes before meals. Will plan to discuss titrating down at next visit pending symptoms.  - Switch from fiber capsules to powdered Metamucil. Augment fiber dosage to 1-3 tablespoons daily in divided doses  - Fecal calprotectin   - Continue NSAID avoidance  - Notify clinic with ongoing or worsening symptoms, recurrence of diarrhea, or weight loss    RTC PRN    Thank you for this consultation. It was a pleasure to participate in the care of this patient; please contact us with any further questions.    Christy Martinez PA-C    50 minutes spent on the date of the encounter doing chart review, review of outside records, review of test results, patient visit and documentation

## 2021-07-08 ENCOUNTER — VIRTUAL VISIT (OUTPATIENT)
Dept: GASTROENTEROLOGY | Facility: CLINIC | Age: 31
End: 2021-07-08
Payer: COMMERCIAL

## 2021-07-08 ENCOUNTER — TRANSFERRED RECORDS (OUTPATIENT)
Dept: HEALTH INFORMATION MANAGEMENT | Facility: CLINIC | Age: 31
End: 2021-07-08

## 2021-07-08 VITALS — WEIGHT: 126 LBS | BODY MASS INDEX: 20.03 KG/M2

## 2021-07-08 DIAGNOSIS — R19.7 DIARRHEA, UNSPECIFIED TYPE: Primary | ICD-10-CM

## 2021-07-08 DIAGNOSIS — K52.832 LYMPHOCYTIC COLITIS: ICD-10-CM

## 2021-07-08 DIAGNOSIS — R10.84 ABDOMINAL PAIN, GENERALIZED: ICD-10-CM

## 2021-07-08 PROCEDURE — 99215 OFFICE O/P EST HI 40 MIN: CPT | Mod: GT | Performed by: PHYSICIAN ASSISTANT

## 2021-07-08 RX ORDER — BUDESONIDE 3 MG/1
CAPSULE, COATED PELLETS ORAL
COMMUNITY
Start: 2021-04-28 | End: 2022-04-12

## 2021-07-08 NOTE — NURSING NOTE
Chief Complaint   Patient presents with     Follow Up       Vitals:    07/08/21 0633   Weight: 57.2 kg (126 lb)       Body mass index is 20.03 kg/m .    Trinity Mei CMA

## 2021-07-08 NOTE — PATIENT INSTRUCTIONS
It was a pleasure taking care of you today.  I've included a brief summary of our discussion and care plan from today's visit below.  Please review this information with your primary care provider.  _______________________________________________________________________    My recommendations are summarized as follows:    Plan:  - Continue pantoprazole 40 mg twice daily 30-60 minutes before meals  - Switch from fiber capsules to powdered Metamucil. Increase daily fiber dosage to 1-3 tablespoons daily in divided doses. Make sure this is mixed well with at least 8-12 ounces of fluid, with goal of at least 50-60 total ounces of fluid daily.  - Fecal calprotectin stool test - call the lab to ensure this is collected properly. Lab orders have been placed,which can be performed at any [facility] lab at your convenience. You can call our lab at: [phone].   - Continue NSAID avoidance  - Notify clinic with ongoing or worsening symptoms, recurrence of diarrhea, or weight loss    Return to GI Clinic in 1-2 months to review your progress, sooner if needed!    ______________________________________________________________________    How do I schedule labs, imaging studies, or procedures that were ordered in clinic today?     Labs: To schedule lab appointment at the [facility] and [facility], use my chart or call [phone]. If you have a [facility] lab closer to home where you are regularly seen you can give them a call.     Procedures: If a colonoscopy, upper endoscopy, breath test, esophageal manometry, or pH impedence was ordered today, our endoscopy team will call you to schedule this. If you have not heard from our endoscopy team within a week, please call [phone] to schedule.     Imaging Studies: If you were scheduled for a CT scan, X-ray, MRI, ultrasound, HIDA scan or other imaging study, please call [phone] to have this scheduled.     Referral: If a referral to another specialty was ordered, expect a phone  call or follow instructions above. If you have not heard from anyone regarding your referral in a week, please call our clinic to check the status.     Who do I call with any questions after my visit?  Please be in touch if there are any further questions that arise following today's visit.  There are multiple ways to contact your gastroenterology care team.        During business hours, you may reach a Gastroenterology nurse at [phone]      To schedule or reschedule an appointment, please call [phone].       You can always send a secure message through burrp!.  burrp! messages are answered by your nurse or doctor typically within 24 hours.  Please allow extra time on weekends and holidays.        For urgent/emergent questions after business hours, you may reach the on-call GI Fellow by contacting the [facility]  at [phone].     How will I get the results of any tests ordered?    You will receive all of your results.  If you have signed up for burrp!, any tests ordered at your visit will be available to you after your physician reviews them.  Typically this takes 1-2 weeks.  If there are urgent results that require a change in your care plan, your physician or nurse will call you to discuss the next steps.      What is burrp!?  burrp! is a secure way for you to access all of your healthcare records from the [facility].  It is a web based computer program, so you can sign on to it from any location.  It also allows you to send secure messages to your care team.  I recommend signing up for burrp! access if you have not already done so and are comfortable with using a computer.      How to I schedule a follow-up visit?  If you did not schedule a follow-up visit today, please call [phone] to schedule a follow-up office visit.      Sincerely,    Christy Martinez PA-C  Division of Gastroenterology, Hepatology & Nutrition  [facility]

## 2021-07-08 NOTE — PROGRESS NOTES
"Gabriela Olguin is a 31 year old female who is being evaluated via a billable video visit.      Please send link to email:  [email or web address]    The patient has been notified of following:     \"This video visit will be conducted via a call between you and your physician/provider. We have found that certain health care needs can be provided without the need for an in-person physical exam.  This service lets us provide the care you need with a video conversation.  If a prescription is necessary we can send it directly to your pharmacy.  If lab work is needed we can place an order for that and you can then stop by our lab to have the test done at a later time.    If during the course of the call the physician/provider feels a video visit is not appropriate, you will not be charged for this service.\"     Patient confirmed that they are in [place] for today's visit yes.    Video-Visit Details  Type of service:  Video Visit    Video Start Time: 700  Video End Time:  730 - switched to phone call due to audio issues on video, completed a 10 minute phone call    Originating Location (pt. Location): Home    Distant Location (provider location):  [facility] [place]     Platform used: Екатерина            "

## 2021-07-08 NOTE — LETTER
7/8/2021         RE: Mariana Olguin  [address] [facility] [address]        Dear Colleague,    Thank you for referring your patient, Mariana Olguin, to the [facility]STROENTEROLOG[facility] CLINIC [place]. Please see a copy of my visit note below.    GI CLINIC VISIT     CC/REFERRING PROVIDER: Loco Puckett  REASON FOR CONSULTATION: abdominal pain and diarrhea    HPI: 31 year old female with PMH of anxiety/depression, bipolar, presenting to GI clinic for follow-up of abdominal pain and diarrhea.    Mariana presented with a one year history of postprandial abdomianl cramping and diarrhea, with progressive symptoms in Spring 2021.Initially noted watery diarrea lasting several hours, triggered postprandially, with 5-6 BMs/day. This was in the context of daily NSAID use. Saw PCP 3/19/2021 with abdominal pain, diarrhea, weight loss with normal CBC, negative HIV, normal CMP. Went to [facility] GI - had EGD and colonoscopy. EGD with erythematous mucosa in stomach, otherwise normal. Colon with ulcers in sigmoid colon, possible resolving ischemic colitis. Biopsies returned and showed microscopic colitis. Duodenal biopsies were negative for celiac disease. Given resolution of diarrhea and cessation of NSAID, budesonide was not started. There was also some concern regarding potential effect on mood from her psychiatry team. She started pantoprazole 40 mg in the morning with significant improvement, as well as Metamucil with every meal, with     Has been taking pantoprazole in AM which has been significantly helping, has been taking for 1 month.   Also taking metamucil with every meal which is helping with abdominal cramping, worse cramping without this.   Went to follow up visit with Emi and was given budesonide, didn't start taking as was worried this may affect her mood after talking with psychiatrist and with GI.   Also given dicyclomine, hasn't needed since not having abdominal pain.     Interval  "history:  Mariana reports she was doing fairly well for a period of time on Metamucil 3 capsTID and pantoprazole 40 mg twice daily, however was still having mild \"stomach upset\", which she further  describes as abdominal cramping. Several weeks ago, the symptoms worsened again with more significant abdominal cramping, worsened with spicy foods, citrus, raw vegetables. She regained weight that she previously lost, but now feels like she might be starting to lose weight again. Daily bowel movement, hard, small, dime-sized pieces, associated with abdominal bloating.    ROS: 10pt ROS performed and otherwise negative.    PAST MEDICAL HISTORY:  Anxiety  Depression   Bipolar    PREVIOUS ABDOMINAL/GYNECOLOGIC SURGERIES:  Past Surgical History:   Procedure Laterality Date     DENTAL SURGERY      Many Teeth     PERTINENT MEDICATIONS:  Current Outpatient Medications   Medication Sig Dispense Refill     buPROPion (WELLBUTRIN XL) 300 MG 24 hr tablet Take 300 mg by mouth every morning       buPROPion (WELLBUTRIN XL) 300 MG 24 hr tablet TK 1 T PO QD       lamoTRIgine (LAMICTAL) 150 MG tablet Take 150 mg by mouth daily       lamoTRIgine (LAMICTAL) 150 MG tablet TK 1 T PO D       levonorgestrel (MIRENA) 20 MCG/24HR IUD 1 each by Intrauterine route once       metroNIDAZOLE (METROGEL) 0.75 % vaginal gel Insert one applicatorful nightly for 7 nights 30 g 0     SOCIAL HISTORY:  Smoking: None  EtOH: Minimal EtOH use  Social History     Socioeconomic History     Marital status: Single     Spouse name: Not on file     Number of children: Not on file     Years of education: Not on file     Highest education level: Not on file   Occupational History     Not on file   Social Needs     Financial resource strain: Not on file     Food insecurity     Worry: Not on file     Inability: Not on file     Transportation needs     Medical: Not on file     Non-medical: Not on file   Tobacco Use     Smoking status: Never Smoker     Smokeless tobacco: " Never Used   Substance and Sexual Activity     Alcohol use: Yes     Frequency: 2-4 times a month     Drinks per session: 1 or 2     Drug use: Never     Sexual activity: Yes     Partners: Male     Birth control/protection: I.U.D.   Lifestyle     Physical activity     Days per week: Not on file     Minutes per session: Not on file     Stress: Not on file   Relationships     Social connections     Talks on phone: Not on file     Gets together: Not on file     Attends Yazidism service: Not on file     Active member of club or organization: Not on file     Attends meetings of clubs or organizations: Not on file     Relationship status: Not on file     Intimate partner violence     Fear of current or ex partner: Not on file     Emotionally abused: Not on file     Physically abused: Not on file     Forced sexual activity: Not on file   Other Topics Concern     Not on file   Social History Narrative    ** Merged History Encounter **          FAMILY HISTORY:  No colon cancer  Family History   Problem Relation Age of Onset     Hypertension Father      Breast Cancer Paternal Grandmother      PHYSICAL EXAMINATION:  Video physical exam  General: Patient appears well in no acute distress.   Skin: No visualized rash or lesions on visualized skin  Eyes: EOMI, no erythema, sclera icterus or discharge noted  Resp: Appears to be breathing comfortably without accessory muscle usage, speaking in full sentences, no cough  MSK: Appears to have normal range of motion based on visualized movements  Neurologic: No apparent tremors, facial movements symmetric  Psych: affect normal, alert and oriented    The rest of a comprehensive physical examination is deferred due to PHE (public health emergency) video restrictions    PERTINENT STUDIES Reviewed in EMR    ASSESSMENT/PLAN:  31 year old female with PMH of anxiety/depression, bipolar, presenting to GI clinic for abdominal pain and diarrhea. Pain and diarrhea worsened 2 months ago, largely  "postprandial, in setting of NSAID use. EGD/colon with erythematous mucosa in stomach, sigmoid ulcers and biopsies with MC, all of which may have been triggered by NSAID use. Previously improving with NSAID cessation, PPI, and fiber supplementation, however now with recurrence of abdominal cramping and bloating, in the setting of daily BSC 1 stools.    With resolution of diarrhea, microscopic colitis is unlikely the underlying etiology for her symptoms, though could be possible. We can check a fecal calprotectin, knowing that the pantoprazole may falsely elevate this value. If negative, would provide reassurance that symptoms are likely unrelated to previous finding of microscopic colitis while on NSAIDs. 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 She prefers to avoid budesonide if possible.    Plan:  - Continue pantoprazole 40 mg twice daily 30-60 minutes before meals. Will plan to discuss titrating down at next visit pending symptoms.  - Switch from fiber capsules to powdered Metamucil. Augment fiber dosage to 1-3 tablespoons daily in divided doses  - Fecal calprotectin   - Continue NSAID avoidance  - Notify clinic with ongoing or worsening symptoms, recurrence of diarrhea, or weight loss    RTC PRN    Thank you for this consultation. It was a pleasure to participate in the care of this patient; please contact us with any further questions.    Christy Martinez PA-C    50 minutes spent on the date of the encounter doing chart review, review of outside records, review of test results, patient visit and documentation        Gabriela Olguin is a 31 year old female who is being evaluated via a billable video visit.      Please send link to email:  [email or web address]    The patient has been notified of following:     \"This video visit will be conducted via a call between you and your physician/provider. We have found that certain health care needs can be provided " "without the need for an in-person physical exam.  This service lets us provide the care you need with a video conversation.  If a prescription is necessary we can send it directly to your pharmacy.  If lab work is needed we can place an order for that and you can then stop by our lab to have the test done at a later time.    If during the course of the call the physician/provider feels a video visit is not appropriate, you will not be charged for this service.\"     Patient confirmed that they are in [place] for today's visit yes.    Video-Visit Details  Type of service:  Video Visit    Video Start Time: 700  Video End Time:  730 - switched to phone call due to audio issues on video, completed a 10 minute phone call    Originating Location (pt. Location): Home    Distant Location (provider location):  [facility]STRO[facility]OLOG[facility] CLIN[facility] [place]     Platform used: Екатерина                Again, thank you for allowing me to participate in the care of your patient.        Sincerely,        Christy Martinez PA-C    "

## 2021-07-15 DIAGNOSIS — K31.9 GASTROPATHY: ICD-10-CM

## 2021-07-15 DIAGNOSIS — R10.13 DYSPEPSIA: Primary | ICD-10-CM

## 2021-07-15 RX ORDER — PANTOPRAZOLE SODIUM 40 MG/1
40 TABLET, DELAYED RELEASE ORAL
Qty: 90 TABLET | Refills: 3 | Status: SHIPPED | OUTPATIENT
Start: 2021-07-15 | End: 2022-04-12

## 2021-08-30 RX ORDER — PANTOPRAZOLE SODIUM 40 MG/1
40 TABLET, DELAYED RELEASE ORAL DAILY
Qty: 30 TABLET | Refills: 0 | OUTPATIENT
Start: 2021-08-30

## 2021-10-01 ENCOUNTER — VIRTUAL VISIT (OUTPATIENT)
Dept: GASTROENTEROLOGY | Facility: CLINIC | Age: 31
End: 2021-10-01
Payer: COMMERCIAL

## 2021-10-01 VITALS — BODY MASS INDEX: 21.3 KG/M2 | WEIGHT: 134 LBS

## 2021-10-01 DIAGNOSIS — R10.84 ABDOMINAL PAIN, GENERALIZED: Primary | ICD-10-CM

## 2021-10-01 PROCEDURE — 99215 OFFICE O/P EST HI 40 MIN: CPT | Mod: GT | Performed by: PHYSICIAN ASSISTANT

## 2021-10-01 NOTE — PROGRESS NOTES
"Mariana Olguin is a 31 year old female who is being evaluated via a billable video visit.      Please send link to email:  [email or web address]    The patient has been notified of following:     \"This video visit will be conducted via a call between you and your physician/provider. We have found that certain health care needs can be provided without the need for an in-person physical exam.  This service lets us provide the care you need with a video conversation.  If a prescription is necessary we can send it directly to your pharmacy.  If lab work is needed we can place an order for that and you can then stop by our lab to have the test done at a later time.    If during the course of the call the physician/provider feels a video visit is not appropriate, you will not be charged for this service.\"     Patient confirmed that they are in [place] for today's visit yes.    Video-Visit Details  Type of service:  Video Visit    Video Start Time: 659  Video End Time:  730    Originating Location (pt. Location): Home    Distant Location (provider location):  [facility]OLOG[facility] [place]     Platform used: [facility]      GI CLINIC VISIT     CC/REFERRING PROVIDER: Loco Puckett  REASON FOR CONSULTATION: abdominal pain and diarrhea    HPI: 31 year old female with PMH of anxiety/depression, bipolar, presenting to GI clinic for follow-up of abdominal pain and diarrhea.    Mariana presented with a one year history of postprandial abdomianl cramping and diarrhea, with progressive symptoms in Spring 2021.Initially noted watery diarrhea lasting several hours, triggered postprandially, with 5-6 BMs/day. This was in the context of daily NSAID use. Saw PCP 3/19/2021 with abdominal pain, diarrhea, weight loss with normal CBC, negative HIV, normal CMP. Went to [facility] GI - had EGD and colonoscopy. EGD with erythematous mucosa in stomach, otherwise normal. Colon with ulcers in sigmoid colon, possible " "resolving ischemic colitis. Biopsies returned and showed microscopic colitis. Duodenal biopsies were negative for celiac disease. Given resolution of diarrhea and cessation of NSAID, budesonide was not started. There was also some concern regarding potential effect on mood from her psychiatry team. She started pantoprazole 40 mg in the morning with significant improvement, as well as Metamucil with every meal, with improvement.    At most recent visit, she reports doing well for a period of time with the above interventions, however then started to experience \"stomach upset\" described as abdominal cramping, worsened with dietary triggers including spicy foods, citrus, raw vegetables. Daily bowel movement, hard, small, dime-sized pieces, associated with abdominal bloating.    Today, Mariana notes she overall feels worse. Continues to have abdominal cramping, migratory, particularly with dietary triggers, stress, occurring around 30 minutes after eating and associated with increased gas. She has no GERD symptoms, dysphagia, odynophagia, early satiety, or emesis. Occasional nausea. She is having 1-2 formed BM per day, can be small and incomplete at times. Skips 2-3 days every few weeks. She is distressed by her symptoms.      ROS: 10pt ROS performed and otherwise negative.    PAST MEDICAL HISTORY:  Anxiety  Depression   Bipolar    PREVIOUS ABDOMINAL/GYNECOLOGIC SURGERIES:  Past Surgical History:   Procedure Laterality Date     DENTAL SURGERY      [place] Teeth     PERTINENT MEDICATIONS:  Current Outpatient Medications   Medication Sig Dispense Refill     budesonide (ENTOCORT EC) 3 MG EC capsule TAKE THREE CAPSULES BY MOUTH DAILY       buPROPion (WELLBUTRIN XL) 300 MG 24 hr tablet Take 300 mg by mouth every morning       lamoTRIgine (LAMICTAL) 150 MG tablet Take 150 mg by mouth daily       levonorgestrel (MIRENA) 20 MCG/24HR IUD 1 each by Intrauterine route once       metroNIDAZOLE (METROGEL) 0.75 % vaginal gel Insert one " applicatorful nightly for 7 nights 30 g 0     pantoprazole (PROTONIX) 40 MG EC tablet Take 1 tablet (40 mg) by mouth 2 times daily (before meals) 90 tablet 3     buPROPion (WELLBUTRIN XL) 300 MG 24 hr tablet TK 1 T PO QD       lamoTRIgine (LAMICTAL) 150 MG tablet TK 1 T PO D       SOCIAL HISTORY:  Smoking: None  EtOH: Minimal EtOH use  Social History     Socioeconomic History     Marital status: Single     Spouse name: Not on file     Number of children: Not on file     Years of education: Not on file     Highest education level: Not on file   Occupational History     Not on file   Tobacco Use     Smoking status: Never Smoker     Smokeless tobacco: Never Used   Substance and Sexual Activity     Alcohol use: Yes     Drug use: Never     Sexual activity: Yes     Partners: Male     Birth control/protection: I.U.D.   Other Topics Concern     Not on file   Social History Narrative    ** Merged History Encounter **          Social Determinants of Health     Financial Resource Strain:      Difficulty of Paying Living Expenses:    Food Insecurity:      Worried About Running Out of Food in the Last Year:      Ran Out of Food in the Last Year:    Transportation Needs:      Lack of Transportation (Medical):      Lack of Transportation (Non-Medical):    Physical Activity:      Days of Exercise per Week:      Minutes of Exercise per Session:    Stress:      Feeling of Stress :    Social Connections:      Frequency of Communication with Friends and Family:      Frequency of Social Gatherings with Friends and Family:      Attends Jain Services:      Active Member of Clubs or Organizations:      Attends Club or Organization Meetings:      Marital Status:    Intimate Partner Violence:      Fear of Current or Ex-Partner:      Emotionally Abused:      Physically Abused:      Sexually Abused:      FAMILY HISTORY:  No colon cancer  Family History   Problem Relation Age of Onset     Hypertension Father      Breast Cancer Paternal  Grandmother      PHYSICAL EXAMINATION:  Video physical exam  General: Patient appears well in no acute distress.   Skin: No visualized rash or lesions on visualized skin  Eyes: EOMI, no erythema, sclera icterus or discharge noted  Resp: Appears to be breathing comfortably without accessory muscle usage, speaking in full sentences, no cough  MSK: Appears to have normal range of motion based on visualized movements  Neurologic: No apparent tremors, facial movements symmetric  Psych: affect normal, alert and oriented    The rest of a comprehensive physical examination is deferred due to PHE (public health emergency) video restrictions    PERTINENT STUDIES Reviewed in EMR    ASSESSMENT/PLAN:  31 year old female with PMH of anxiety/depression, bipolar, presenting to GI clinic for follow-up of abdominal pain and diarrhea.      Mariana developed abdominal pain and diarrhea in setting of NSAID use, with EGD/colonosopy showing mucosa in stomach, sigmoid ulcers and biopsies with MC, all of which may have been triggered by NSAID use. Previously improving with NSAID cessation, PPI, and fiber supplementation, however now with recurrence of abdominal cramping and bloating, in the setting of daily BSC 2-3 stools.    With resolution of diarrhea, microscopic colitis is unlikely the underlying etiology for her symptoms, though could be possible. We can check a fecal calprotectin, knowing that the pantoprazole may falsely elevate this value. If negative, would provide reassurance that symptoms are likely unrelated to previous finding of microscopic colitis while on NSAIDs. 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     Plan:  -- Continue pantoprazole 40 mg twice daily 30-60 minutes before meals. Hold off adjustments for now as she has had increased symptoms attempting to reduce this.  -- Check fecal calporectin  -- Referral to GI  dietician  -- Trial enteric coated peppermint  -- Continue fiber supplementation as doing. Can add in over-the-counter magnesium supplementation 200-400 mg nightly as needed.  -- Continue NSAID avoidance  -- Future considerations include repeat colonoscopy pending fecal romina    RTC 4 weeks per patient preference    Thank you for this consultation. It was a pleasure to participate in the care of this patient; please contact us with any further questions.    Christy Maritnez PA-C    43 minutes spent on the date of the encounter doing chart review, review of outside records, review of test results, patient visit and documentation

## 2021-10-01 NOTE — NURSING NOTE
Chief Complaint   Patient presents with     Follow Up       Vitals:    10/01/21 0636   Weight: 60.8 kg (134 lb)       Body mass index is 21.3 kg/m .    Trinity Mei CMA

## 2021-10-01 NOTE — PATIENT INSTRUCTIONS
It was a pleasure taking care of you today.  I've included a brief summary of our discussion and care plan from today's visit below.  Please review this information with your primary care provider.  _______________________________________________________________________    My recommendations are summarized as follows:    -- Continue pantoprazole 40 mg twice daily 30-60 minutes before meals. We can hold off adjustments for now.  -- Check fecal calporectin (inflammation marker). Lab orders have been placed,which can be performed at any [facility] lab at your convenience. To schedule lab appointment here, use my chart or call [phone].   -- Referral to GI dietician placed. If you do not here from scheduling, you can call our clinic to help set this up.  -- Trial enteric coated peppermint (IBGard).This is available over the counter and can help with gas and cramping.  -- Continue fiber supplementation as doing. Can add in over-the-counter magnesium supplementation 200-400 mg nightly as needed if you are having the small, incomplete stools, or missing days without bowel movements.  -- Continue NSAID avoidance    Return to GI Clinic in 1 month to review your progress.    ______________________________________________________________________    How do I schedule labs, imaging studies, or procedures that were ordered in clinic today?     Labs: To schedule lab appointment at the [facility] and [facility], use my chart or call [phone]. If you have a [facility] lab closer to home where you are regularly seen you can give them a call.     Procedures: If a colonoscopy, upper endoscopy, breath test, esophageal manometry, or pH impedence was ordered today, our endoscopy team will call you to schedule this. If you have not heard from our endoscopy team within a week, please call [phone] to schedule.     Imaging Studies: If you were scheduled for a CT scan, X-ray, MRI, ultrasound, HIDA scan or other imaging study, please  call [phone] to have this scheduled.     Referral: If a referral to another specialty was ordered, expect a phone call or follow instructions above. If you have not heard from anyone regarding your referral in a week, please call our clinic to check the status.     Who do I call with any questions after my visit?  Please be in touch if there are any further questions that arise following today's visit.  There are multiple ways to contact your gastroenterology care team.        During business hours, you may reach a Gastroenterology nurse at [phone]      To schedule or reschedule an appointment, please call [phone].       You can always send a secure message through [employer].  [employer] messages are answered by your nurse or doctor typically within 24 hours.  Please allow extra time on weekends and holidays.        For urgent/emergent questions after business hours, you may reach the on-call GI Fellow by contacting the [facility]  at [phone].     How will I get the results of any tests ordered?    You will receive all of your results.  If you have signed up for [employer], any tests ordered at your visit will be available to you after your physician reviews them.  Typically this takes 1-2 weeks.  If there are urgent results that require a change in your care plan, your physician or nurse will call you to discuss the next steps.      What is [employer]?  [employer] is a secure way for you to access all of your healthcare records from the [facility].  It is a web based computer program, so you can sign on to it from any location.  It also allows you to send secure messages to your care team.  I recommend signing up for [employer] access if you have not already done so and are comfortable with using a computer.      How to I schedule a follow-up visit?  If you did not schedule a follow-up visit today, please call [phone] to schedule a follow-up office visit.       Sincerely,    Christy Martinez PA-C  Division of Gastroenterology, Hepatology & Nutrition  [facility]

## 2021-10-02 ENCOUNTER — PATIENT OUTREACH (OUTPATIENT)
Dept: GASTROENTEROLOGY | Facility: CLINIC | Age: 31
End: 2021-10-02

## 2021-10-02 NOTE — PROGRESS NOTES
Attempted to reach patient to schedule follow up in the Gastroenterology Clinic.  No answer,  LM on VM to call office and [employer] message sent.    Schedule with GM Gilliland in 1 month and with dietician (Kim).

## 2021-10-24 ENCOUNTER — HEALTH MAINTENANCE LETTER (OUTPATIENT)
Age: 31
End: 2021-10-24

## 2022-04-04 ENCOUNTER — HOSPITAL ENCOUNTER (OUTPATIENT)
Dept: CT IMAGING | Facility: CLINIC | Age: 32
Discharge: HOME | End: 2022-04-04
Attending: INTERNAL MEDICINE | Admitting: INTERNAL MEDICINE
Payer: COMMERCIAL

## 2022-04-04 DIAGNOSIS — R10.9 ABDOMINAL PAIN, UNSPECIFIED ABDOMINAL LOCATION: ICD-10-CM

## 2022-04-04 DIAGNOSIS — R11.0 NAUSEA: ICD-10-CM

## 2022-04-04 PROCEDURE — 250N000009 HC RX 250: Performed by: INTERNAL MEDICINE

## 2022-04-04 PROCEDURE — 250N000011 HC RX IP 250 OP 636: Performed by: INTERNAL MEDICINE

## 2022-04-04 PROCEDURE — 74177 CT ABD & PELVIS W/CONTRAST: CPT

## 2022-04-04 RX ORDER — IOPAMIDOL 755 MG/ML
66 INJECTION, SOLUTION INTRAVASCULAR ONCE
Status: COMPLETED | OUTPATIENT
Start: 2022-04-04 | End: 2022-04-04

## 2022-04-04 RX ADMIN — IOPAMIDOL 66 ML: 755 INJECTION, SOLUTION INTRAVENOUS at 17:54

## 2022-04-04 RX ADMIN — SODIUM CHLORIDE 63 ML: 9 INJECTION, SOLUTION INTRAVENOUS at 17:54

## 2022-04-11 NOTE — PROGRESS NOTES
SUBJECTIVE:   CC: Mariana Olguin is an 32 year old woman who presents for preventive health visit.     Patient has been advised of split billing requirements and indicates understanding: Yes     HPI  # Health Maintenance  - HIV Screening: no concerns  - STI Screening: no concerns  - Hep C Screening: no concerns  - BP:   BP Readings from Last 3 Encounters:   03/19/21 121/84   02/28/20 124/81   - Cholesterol: pending  Recent Labs   Lab Test 03/19/21  1418   CHOL 201.0*   HDL 55.0   .0*   TRIG 66.0   The ASCVD Risk score (Steffen WEINER Jr., et al., 2013) failed to calculate for the following reasons:    The 2013 ASCVD risk score is only valid for ages 40 to 79  - Diabetes Screening: pending  - Lung Cancer Screening: not indicated  55-81yo w/30py smoking history and currently smoking OR quit within past 15 years:  Low dose CT annually and discontinued once a person has been 15 years tobacco free  - (+) seatbelt use, (+) helmet, (+) smoke detector  - Feels safe at home, denies verbal/physical/emotional abuse in past year: yes  - Last Pap: no record on file. Per patient, she believes her last test was 2 years ago with OBGYN with no concerning findings. Plans to return to GYN for follow up.  - Colonoscopy: 2022, clear.   - Mammogram: not indicated  - DEXA: not indicated  - Diet: limited due to ongoing GI issues  - Exercise: no concerns      PROBLEMS TO ADD ON...  Tremors  - intermittent  - thinks they became more of an issue when GI decreased her PPI dosing about a month ago  - does not think this is related to low blood sugars. While she does acknowledge she has a very limited diet due to ongoing GI issues she reports she eats a lot of what she can eat.  - recent labs from [facility]: ESR, CRT, Thyroid cascade, lipase, were all wnls  - thinks maybe she is more aware of the tremor if she is tired  - also notes shoulder pain that will sometimes radiate down her arms    Hearing concerns  - per patient, her sister has  "commented recently that Mariana \"says 'what?' more than anyone I know\"  - Mariana denies struggling with hearing but would like to have her hearing tested if possible  - denies ear pain, drainage, congestion, bleeding, dizziness or tinnitus.    Today's PHQ-2 Score:   PHQ-2 ( 1999 Pfizer) 3/19/2021   Q1: Little interest or pleasure in doing things 0   Q2: Feeling down, depressed or hopeless 1   PHQ-2 Score 1   PHQ-2 Total Score (12-17 Years)- Positive if 3 or more points; Administer PHQ-A if positive 1       Abuse: Current or Past (Physical, Sexual or Emotional) - No  Do you feel safe in your environment? Yes    Have you ever done Advance Care Planning? (For example, a Health Directive, POLST, or a discussion with a medical provider or your loved ones about your wishes): No, advance care planning information given to patient to review.  Patient plans to discuss their wishes with loved ones or provider.      Social History     Tobacco Use     Smoking status: Never Smoker     Smokeless tobacco: Never Used   Substance Use Topics     Alcohol use: Yes     If you drink alcohol do you typically have >3 drinks per day or >7 drinks per week? No      Reviewed orders with patient.  Reviewed health maintenance and updated orders accordingly - Yes    Breast Cancer Screening:  Any new diagnosis of family breast, ovarian, or bowel cancer? No    Patient under 40 years of age: Routine Mammogram Screening not recommended.   Pertinent mammograms are reviewed under the imaging tab.    History of abnormal Pap smear: NO - age 30-65 PAP every 5 years with negative HPV co-testing recommended     Reviewed and updated as needed this visit by clinical staff   Tobacco  Allergies  Meds  Problems  Med Hx  Surg Hx  Fam Hx            Reviewed and updated as needed this visit by Provider   Tobacco  Allergies  Meds  Problems  Med Hx  Surg Hx  Fam Hx           Past Medical History:   Diagnosis Date     Anxiety and depression 3/19/2021     " "IUD (intrauterine device) in place 3/19/2021    Placed in 2020      Past Surgical History:   Procedure Laterality Date     DENTAL SURGERY      [place] Teeth       Review of Systems   ROS: 10 point ROS neg other than the symptoms noted above in the HPI.    OBJECTIVE:   /84 (BP Location: Right arm, Patient Position: Sitting, Cuff Size: Adult Regular)   Pulse 80   Temp 96.8  F (36  C) (Temporal)   Resp 16   Ht 1.689 m (5' 6.5\")   Wt 59.6 kg (131 lb 8 oz)   LMP  (LMP Unknown)   SpO2 98%   BMI 20.91 kg/m    Physical Exam  GENERAL: healthy, alert and no distress  EYES: Eyes grossly normal to inspection, PERRL and conjunctivae and sclerae normal  HENT: ear canals and TM's normal, nose and mouth without ulcers or lesions  NECK: no adenopathy, no asymmetry, masses, or scars and thyroid normal to palpation  RESP: lungs clear to auscultation - no rales, rhonchi or wheezes  CV: regular rate and rhythm, normal S1 S2, no S3 or S4, no murmur, click or rub, no peripheral edema and peripheral pulses strong  ABDOMEN: soft, nontender, no hepatosplenomegaly, no masses and bowel sounds normal  MS: no gross musculoskeletal defects noted, no edema  SKIN: no suspicious lesions or rashes  NEURO: Normal strength and tone, mentation intact and speech normal  PSYCH: mentation appears normal, affect normal/bright    Diagnostic Test Results:  Labs reviewed in Epic    ASSESSMENT/PLAN:   Mariana was seen today for physical and gastrointestinal problem.    Diagnoses and all orders for this visit:    Routine general medical examination at a health care facility    Lipid screening  -     Lipid Panel; Future  -     Lipid Panel    Tremor  -     CBC with Plt (LabDA[facility]); Future  -     Testosterone Free and Total; Future  -     Vitamin D Deficiency; Future  -     Vitamin B12; Future  -     Folate; Future  -     CBC with Plt (Lab[facility])  -     Testosterone Free and Total  -     Vitamin D Deficiency  -     Vitamin B12  -     Folate    Screening for " "diabetes mellitus  -     Basic Metabolic Panel; Future  -     Basic Metabolic Panel    Need for Tdap vaccination  -     TDAP VACCINE (Adacel, Boostrix)  [[id number]]    Mixed conductive and sensorineural hearing loss, unspecified laterality  -     Adult Audiology Referral; Future    Abdominal pain, generalized    Agreed to audiology referral as noted.     Unclear of cause for tremor. Considered hypoglycemia vs metabolic deficiencies vs endocrine vs neurologic. Will follow up labs as noted above. Considered referral to neurology to assess for possible myasthenia gravis, but patient would like to hold off on neuro referral at this point to continue focusing on her GI issues. I would be fine referring at any point if Mariana should decide to pursue this further.      Patient has been advised of split billing requirements and indicates understanding: Yes    COUNSELING:  Reviewed preventive health counseling, as reflected in patient instructions    Estimated body mass index is 21.3 kg/m  as calculated from the following:    Height as of 5/11/21: 1.689 m (5' 6.5\").    Weight as of 10/1/21: 60.8 kg (134 lb).        She reports that she has never smoked. She has never used smokeless tobacco.      Counseling Resources:  ATP IV Guidelines  Pooled Cohorts Equation Calculator  Breast Cancer Risk Calculator  BRCA-Related Cancer Risk Assessment: FHS-7 Tool  FRAX Risk Assessment  ICSI Preventive Guidelines  Dietary Guidelines for Americans, 2010  USDA's MyPlate  ASA Prophylaxis  Lung CA Screening    Loco Puckett MD  [facility]  "

## 2022-04-12 ENCOUNTER — OFFICE VISIT (OUTPATIENT)
Dept: FAMILY MEDICINE | Facility: CLINIC | Age: 32
End: 2022-04-12
Payer: COMMERCIAL

## 2022-04-12 ENCOUNTER — MYC MEDICAL ADVICE (OUTPATIENT)
Dept: FAMILY MEDICINE | Facility: CLINIC | Age: 32
End: 2022-04-12

## 2022-04-12 VITALS
DIASTOLIC BLOOD PRESSURE: 84 MMHG | OXYGEN SATURATION: 98 % | BODY MASS INDEX: 20.64 KG/M2 | HEIGHT: 67 IN | TEMPERATURE: 96.8 F | SYSTOLIC BLOOD PRESSURE: 124 MMHG | HEART RATE: 80 BPM | RESPIRATION RATE: 16 BRPM | WEIGHT: 131.5 LBS

## 2022-04-12 DIAGNOSIS — Z13.1 SCREENING FOR DIABETES MELLITUS: ICD-10-CM

## 2022-04-12 DIAGNOSIS — Z00.00 ROUTINE GENERAL MEDICAL EXAMINATION AT A HEALTH CARE FACILITY: Primary | ICD-10-CM

## 2022-04-12 DIAGNOSIS — H90.8 MIXED CONDUCTIVE AND SENSORINEURAL HEARING LOSS, UNSPECIFIED LATERALITY: ICD-10-CM

## 2022-04-12 DIAGNOSIS — Z13.220 LIPID SCREENING: ICD-10-CM

## 2022-04-12 DIAGNOSIS — Z23 NEED FOR TDAP VACCINATION: ICD-10-CM

## 2022-04-12 DIAGNOSIS — R25.1 TREMOR: ICD-10-CM

## 2022-04-12 DIAGNOSIS — R10.84 ABDOMINAL PAIN, GENERALIZED: ICD-10-CM

## 2022-04-12 LAB
ANION GAP SERPL CALCULATED.3IONS-SCNC: 6 MMOL/L (ref 3–14)
BUN SERPL-MCNC: 9 MG/DL (ref 7–30)
CALCIUM SERPL-MCNC: 9.2 MG/DL (ref 8.5–10.1)
CHLORIDE BLD-SCNC: 105 MMOL/L (ref 94–109)
CHOLEST SERPL-MCNC: 227 MG/DL
CO2 SERPL-SCNC: 28 MMOL/L (ref 20–32)
CREAT SERPL-MCNC: 0.72 MG/DL (ref 0.52–1.04)
DEPRECATED CALCIDIOL+CALCIFEROL SERPL-MC: 49 UG/L (ref 20–75)
ERYTHROCYTE [DISTWIDTH] IN BLOOD BY AUTOMATED COUNT: 13 % (ref 10–15)
FASTING STATUS PATIENT QL REPORTED: YES
FOLATE SERPL-MCNC: 19.6 NG/ML
GFR SERPL CREATININE-BSD FRML MDRD: >90 ML/MIN/1.73M2
GLUCOSE BLD-MCNC: 78 MG/DL (ref 70–99)
HCT VFR BLD AUTO: 46.7 % (ref 35–47)
HDLC SERPL-MCNC: 63 MG/DL
HGB BLD-MCNC: 15.4 G/DL (ref 11.7–15.7)
LDLC SERPL CALC-MCNC: 154 MG/DL
MCH RBC QN AUTO: 31.7 PG (ref 26.5–33)
MCHC RBC AUTO-ENTMCNC: 33 G/DL (ref 31.5–36.5)
MCV RBC AUTO: 96 FL (ref 78–100)
NONHDLC SERPL-MCNC: 164 MG/DL
PLATELET # BLD AUTO: 314 10E3/UL (ref 150–450)
POTASSIUM BLD-SCNC: 4.5 MMOL/L (ref 3.4–5.3)
RBC # BLD AUTO: 4.86 10E6/UL (ref 3.8–5.2)
SODIUM SERPL-SCNC: 139 MMOL/L (ref 133–144)
TRIGL SERPL-MCNC: 52 MG/DL
VIT B12 SERPL-MCNC: 697 PG/ML (ref 193–986)
WBC # BLD AUTO: 7.5 10E3/UL (ref 4–11)

## 2022-04-12 PROCEDURE — 82310 ASSAY OF CALCIUM: CPT | Performed by: FAMILY MEDICINE

## 2022-04-12 PROCEDURE — 80061 LIPID PANEL: CPT | Performed by: FAMILY MEDICINE

## 2022-04-12 PROCEDURE — 82746 ASSAY OF FOLIC ACID SERUM: CPT | Performed by: FAMILY MEDICINE

## 2022-04-12 PROCEDURE — 84403 ASSAY OF TOTAL TESTOSTERONE: CPT | Performed by: FAMILY MEDICINE

## 2022-04-12 PROCEDURE — 84270 ASSAY OF SEX HORMONE GLOBUL: CPT | Performed by: FAMILY MEDICINE

## 2022-04-12 PROCEDURE — 82607 VITAMIN B-12: CPT | Performed by: FAMILY MEDICINE

## 2022-04-12 PROCEDURE — 82306 VITAMIN D 25 HYDROXY: CPT | Performed by: FAMILY MEDICINE

## 2022-04-12 RX ORDER — PANTOPRAZOLE SODIUM 40 MG/1
40 TABLET, DELAYED RELEASE ORAL DAILY
COMMUNITY
End: 2022-12-08

## 2022-04-12 ASSESSMENT — ANXIETY QUESTIONNAIRES
5. BEING SO RESTLESS THAT IT IS HARD TO SIT STILL: NOT AT ALL
1. FEELING NERVOUS, ANXIOUS, OR ON EDGE: MORE THAN HALF THE DAYS
GAD7 TOTAL SCORE: 5
2. NOT BEING ABLE TO STOP OR CONTROL WORRYING: SEVERAL DAYS
IF YOU CHECKED OFF ANY PROBLEMS ON THIS QUESTIONNAIRE, HOW DIFFICULT HAVE THESE PROBLEMS MADE IT FOR YOU TO DO YOUR WORK, TAKE CARE OF THINGS AT HOME, OR GET ALONG WITH OTHER PEOPLE: SOMEWHAT DIFFICULT
3. WORRYING TOO MUCH ABOUT DIFFERENT THINGS: SEVERAL DAYS
7. FEELING AFRAID AS IF SOMETHING AWFUL MIGHT HAPPEN: SEVERAL DAYS
6. BECOMING EASILY ANNOYED OR IRRITABLE: NOT AT ALL

## 2022-04-12 ASSESSMENT — PATIENT HEALTH QUESTIONNAIRE - PHQ9
5. POOR APPETITE OR OVEREATING: NOT AT ALL
SUM OF ALL RESPONSES TO PHQ QUESTIONS 1-9: 4

## 2022-04-12 NOTE — NURSING NOTE
"32 year old  Chief Complaint   Patient presents with     Physical     32 yrs old      Gastrointestinal Problem     Follow up on GI sx         Blood pressure 124/84, pulse 80, temperature 96.8  F (36  C), temperature source Temporal, resp. rate 16, height 1.689 m (5' 6.5\"), weight 59.6 kg (131 lb 8 oz), SpO2 98 %, not currently breastfeeding. Body mass index is 20.91 kg/m .  Patient Active Problem List   Diagnosis     Anxiety and depression     IUD (intrauterine device) in place       Wt Readings from Last 2 Encounters:   04/12/22 59.6 kg (131 lb 8 oz)   10/01/21 60.8 kg (134 lb)     BP Readings from Last 3 Encounters:   04/12/22 124/84   03/19/21 121/84   02/28/20 124/81         Current Outpatient Medications   Medication     buPROPion (WELLBUTRIN XL) 300 MG 24 hr tablet     lamoTRIgine (LAMICTAL) 150 MG tablet     levonorgestrel (MIRENA) 20 MCG/24HR IUD     pantoprazole (PROTONIX) 40 MG EC tablet     metroNIDAZOLE (METROGEL) 0.75 % vaginal gel     No current facility-administered medications for this visit.       Social History     Tobacco Use     Smoking status: Never Smoker     Smokeless tobacco: Never Used   Substance Use Topics     Alcohol use: Yes     Drug use: Never       Health Maintenance Due   Topic Date Due     ADVANCE CARE PLANNING  Never done     PAP  Never done     COVID-19 Vaccine (2 - Booster for Ronald series) 06/05/2021     INFLUENZA VACCINE (1) 09/01/2021     PHQ-2 (once per calendar year)  01/01/2022       No results found for: PAP      April 12, 2022 10:04 AM  "

## 2022-04-13 LAB — SHBG SERPL-SCNC: 62 NMOL/L (ref 30–135)

## 2022-04-13 ASSESSMENT — ANXIETY QUESTIONNAIRES: GAD7 TOTAL SCORE: 5

## 2022-04-15 LAB
TESTOST FREE SERPL-MCNC: 0.29 NG/DL
TESTOST SERPL-MCNC: 25 NG/DL (ref 8–60)

## 2022-05-02 ENCOUNTER — VIRTUAL VISIT (OUTPATIENT)
Dept: FAMILY MEDICINE | Facility: CLINIC | Age: 32
End: 2022-05-02
Payer: COMMERCIAL

## 2022-05-02 DIAGNOSIS — R10.84 ABDOMINAL PAIN, GENERALIZED: ICD-10-CM

## 2022-05-02 DIAGNOSIS — Z71.3 DIETARY COUNSELING AND SURVEILLANCE: Primary | ICD-10-CM

## 2022-05-02 NOTE — PROGRESS NOTES
"[place] Nutrition Assessment    Mariana Olguin is a 32 year old female who presents for nutrition counseling related to high cholesterol and concern with body weight. She follows a modified diet due to having complex GI history. Describes \"stomach issues for 2 years\", most recently with a colonoscopy and endoscopy showing ulcers + microscopic colitis but inconsistent with symptoms. Just had a repeat colonoscopy with no ulcers. More imaging planned include CT and GES.     In terms of her weight, one year ago she was 114 lbs. Describes not eating because many foods caused diarrhea and stomach cramping. She has been able to eliminate some foods that are known to cause issues with improvement to her weight status, but now her cholesterol is elevated.     Addition questions include - Does cooking reduce vitamins? Lifts weights and martial arts - how to get enough calories?     Nutrition hx:  Tolerates the BRAT diet,   All meat but prefers fish  Potatoes, white rice, white bread, tortillas, oatmeal, crackers, plain granola, noodles (egg), pastries, sourdough (white refined starches)  Yogurt, cottage cheese, milk, butter, ice cream, cheese (cheddar, goat, etc)  Blueberries, melon, banana, applesauce, raspberries  Green beans and carrots (well cooked), cooked spinach, avocado  Eggs, peanut butter  [place]bur    Tolerates soluble fiber; does not tolerate beans    24 hr recall:  Breakfast: oatmeal with whole milk, 1/2 or whole banana, bowl of blueberries,   1-2 pc toast with peanut butter with milk and yogurt  Lunch: hamburger with avocado and cheese, or pulled chicken/chicken breast sandwich; lot of crackers and cheese  1 yogurt per day  Can have pad [place] at one [place] place, taco with tortilla and cheese    Social: Paints houses   Physical activity: Lifts weights  Medications: metamucil 2.5 tbsp morning and evening (helped with diarrhea), pantoprazole (trying to reduce this dose)      Recent weights:   Wt Readings from " Last 6 Encounters:   04/12/22 59.6 kg (131 lb 8 oz)   10/01/21 60.8 kg (134 lb)   07/08/21 57.2 kg (126 lb)   05/11/21 55.3 kg (122 lb)   03/19/21 52.4 kg (115 lb 8 oz)   02/28/20 59 kg (130 lb)     Recent A1C values: No results found for: A1C  Recent lipid values:   Cholesterol   Date Value Ref Range Status   04/12/2022 227 (H) <200 mg/dL Final   03/19/2021 201.0 (H) 0.0 - 200.0 Final     HDL Cholesterol   Date Value Ref Range Status   03/19/2021 55.0 >50.0 Final     Direct Measure HDL   Date Value Ref Range Status   04/12/2022 63 >=50 mg/dL Final     LDL Cholesterol Calculated   Date Value Ref Range Status   04/12/2022 154 (H) <=100 mg/dL Final     LDL Cholesterol Direct   Date Value Ref Range Status   03/19/2021 133.0 (H) 0.0 - 129.0 Final     Triglycerides   Date Value Ref Range Status   04/12/2022 52 <150 mg/dL Final   03/19/2021 66.0 0.0 - 150.0 Final         Intervention(s):  Mariana Olguin is a 32 year old female who presents for nutrition counseling related to high cholesterol and concern with body weight (goal to maintain weight and prevent unintentional loss).   1. Discussed diet recs to lower cholesterol, namely reducing intake of saturated fat and including fiber as tolerated. Consider adding eric seeds. Add oils that do not contain saturated fat. For variety, smoothies contain all the foods you tolerate and are a good meal and snack.   2. To promote weight maintenance try to eat at regular, predictable intervals - maintain a consistent pattern of eating.   2. Planning ahead a few days will be helpful to ensure you have foods on hand that are both well tolerated, high calorie and also moderate/low in saturated fat.   3. Here is a nice article describing the types of fiber: https://www.\[facility]\""h.[employer].edu/nutritionsource/carbohydrates/fiber/  4. Www.usprobioticguide.com is a good resource to review probiotic options available  5. Consider using these sources to asses dietary supplements:  - National  "Institutes of Health, Office of Dietary Supplements -- this gives a great review of recent literature on many supplements  - ConsumerLab.com -- it does require a subscription for full access, but a great resource that does independent testing of various supplements  - NSF International, Certified for Sport - an independent testing group that looks for quality and safety  - USP US Pharmacopeia- independent testing group that looks for quality and safety     These websites contain a lot of great information, but also you can look for the NSF and USP certified symbols quickly on the supplement packaging if just quickly looking at a store or website.     Monitoring/Evaluation:  As needed, available on My Chart as well    Patient referred by Loco Puckett MD   Total time spent with patient 45 minutes    Porsche Jorgensen RD     The patient has been notified of following:     \"This telephone visit will be conducted via a call between you and your physician/provider. We have found that certain health care needs can be provided without the need for a physical exam.  This service lets us provide the care you need with a short phone conversation.  If a prescription is necessary we can send it directly to your pharmacy.  If lab work is needed we can place an order for that and you can then stop by our lab to have the test done at a later time.    Telephone visits are billed at different rates depending on your insurance coverage. During this emergency period, for some insurers they may be billed the same as an in-person visit.  Please reach out to your insurance provider with any questions.    If during the course of the call the physician/provider feels a telephone visit is not appropriate, you will not be charged for this service.\"    Patient has given verbal consent for Telephone visit?  Yes    What phone number would you like to be contacted at? mobile    How would you like to obtain your AVS? Elva    I spoke with: " Mariana Olguin   The reason for the telephone visit was: nutrition counseling    Phone call contact time  Video from 8:00 am - 8:25 am, followed by a phone call due to connectivity issues  Call started at: 8:25 am  Call ended at: 8:45 am

## 2022-05-03 ENCOUNTER — HOSPITAL ENCOUNTER (OUTPATIENT)
Dept: NUCLEAR MEDICINE | Facility: CLINIC | Age: 32
Setting detail: NUCLEAR MEDICINE
Discharge: HOME | End: 2022-05-03
Attending: INTERNAL MEDICINE | Admitting: INTERNAL MEDICINE
Payer: COMMERCIAL

## 2022-05-03 DIAGNOSIS — R11.0 NAUSEA: ICD-10-CM

## 2022-05-03 DIAGNOSIS — R10.9 ABDOMINAL PAIN, UNSPECIFIED ABDOMINAL LOCATION: ICD-10-CM

## 2022-05-03 PROCEDURE — 343N000001 HC RX 343: Performed by: INTERNAL MEDICINE

## 2022-05-03 PROCEDURE — A9541 TC99M SULFUR COLLOID: HCPCS | Performed by: INTERNAL MEDICINE

## 2022-05-03 PROCEDURE — 78264 GASTRIC EMPTYING IMG STUDY: CPT

## 2022-05-03 RX ADMIN — Medication 1.1 MILLICURIE: at 07:40

## 2022-05-05 NOTE — PATIENT INSTRUCTIONS
1. Discussed diet recs to lower cholesterol, namely reducing intake of saturated fat and including fiber as tolerated. Consider adding eric seeds. Add oils that do not contain saturated fat. For variety, smoothies contain all the foods you tolerate and are a good meal and snack.   2. To promote weight maintenance try to eat at regular, predictable intervals - maintain a consistent pattern of eating.   2. Planning ahead a few days will be helpful to ensure you have foods on hand that are both well tolerated, high calorie and also moderate/low in saturated fat.   3. Here is a nice article describing the types of fiber: https://www.[facility].[place].edu/nutritionsource/carbohydrates/fiber/  4. Www.Deedrobioticguide.com is a good resource to review probiotic options available  5. Consider using these sources to asses dietary supplements:  - National Institutes of Health, Office of Dietary Supplements -- this gives a great review of recent literature on many supplements  - ConsumerLab.com -- it does require a subscription for full access, but a great resource that does independent testing of various supplements  - NSF International, Certified for Sport - an independent testing group that looks for quality and safety  - USP US Pharmacopeia- independent testing group that looks for quality and safety     These websites contain a lot of great information, but also you can look for the NSF and USP certified symbols quickly on the supplement packaging if just quickly looking at a store or website.

## 2022-05-10 ENCOUNTER — TRANSFERRED RECORDS (OUTPATIENT)
Dept: HEALTH INFORMATION MANAGEMENT | Facility: CLINIC | Age: 32
End: 2022-05-10
Payer: COMMERCIAL

## 2022-05-17 ENCOUNTER — TRANSFERRED RECORDS (OUTPATIENT)
Dept: HEALTH INFORMATION MANAGEMENT | Facility: CLINIC | Age: 32
End: 2022-05-17

## 2022-05-25 ENCOUNTER — TRANSFERRED RECORDS (OUTPATIENT)
Dept: HEALTH INFORMATION MANAGEMENT | Facility: CLINIC | Age: 32
End: 2022-05-25
Payer: COMMERCIAL

## 2022-06-08 ENCOUNTER — TRANSFERRED RECORDS (OUTPATIENT)
Dept: HEALTH INFORMATION MANAGEMENT | Facility: CLINIC | Age: 32
End: 2022-06-08

## 2022-06-16 ENCOUNTER — TRANSFERRED RECORDS (OUTPATIENT)
Dept: HEALTH INFORMATION MANAGEMENT | Facility: CLINIC | Age: 32
End: 2022-06-16

## 2022-06-16 LAB — PHQ9 SCORE: 8

## 2022-07-05 ENCOUNTER — TRANSFERRED RECORDS (OUTPATIENT)
Dept: HEALTH INFORMATION MANAGEMENT | Facility: CLINIC | Age: 32
End: 2022-07-05

## 2022-07-15 ENCOUNTER — TRANSFERRED RECORDS (OUTPATIENT)
Dept: HEALTH INFORMATION MANAGEMENT | Facility: CLINIC | Age: 32
End: 2022-07-15

## 2022-07-21 ENCOUNTER — TRANSFERRED RECORDS (OUTPATIENT)
Dept: HEALTH INFORMATION MANAGEMENT | Facility: CLINIC | Age: 32
End: 2022-07-21

## 2022-07-23 ENCOUNTER — TRANSFERRED RECORDS (OUTPATIENT)
Dept: HEALTH INFORMATION MANAGEMENT | Facility: CLINIC | Age: 32
End: 2022-07-23

## 2022-10-15 ENCOUNTER — HEALTH MAINTENANCE LETTER (OUTPATIENT)
Age: 32
End: 2022-10-15

## 2022-12-08 ENCOUNTER — OFFICE VISIT (OUTPATIENT)
Dept: FAMILY MEDICINE | Facility: CLINIC | Age: 32
End: 2022-12-08
Payer: COMMERCIAL

## 2022-12-08 VITALS
WEIGHT: 132.25 LBS | DIASTOLIC BLOOD PRESSURE: 87 MMHG | HEART RATE: 86 BPM | BODY MASS INDEX: 21.03 KG/M2 | OXYGEN SATURATION: 99 % | RESPIRATION RATE: 12 BRPM | SYSTOLIC BLOOD PRESSURE: 118 MMHG | TEMPERATURE: 97.9 F

## 2022-12-08 DIAGNOSIS — J02.9 SORE THROAT: Primary | ICD-10-CM

## 2022-12-08 PROBLEM — R10.84 ABDOMINAL PAIN, GENERALIZED: Status: RESOLVED | Noted: 2022-04-12 | Resolved: 2022-12-08

## 2022-12-08 PROBLEM — K64.9 HEMORRHOIDS: Status: ACTIVE | Noted: 2022-03-24

## 2022-12-08 LAB
DEPRECATED S PYO AG THROAT QL EIA: NEGATIVE
GROUP A STREP BY PCR: NOT DETECTED

## 2022-12-08 PROCEDURE — 87651 STREP A DNA AMP PROBE: CPT | Performed by: FAMILY MEDICINE

## 2022-12-08 RX ORDER — ACETAMINOPHEN 325 MG/1
325-650 TABLET ORAL EVERY 6 HOURS PRN
COMMUNITY

## 2022-12-08 ASSESSMENT — ENCOUNTER SYMPTOMS
ABDOMINAL PAIN: 0
TROUBLE SWALLOWING: 0
PALPITATIONS: 0
FEVER: 0
SINUS PAIN: 0
EYE ITCHING: 0
SINUS PRESSURE: 0
RHINORRHEA: 1
VOMITING: 0
FATIGUE: 0
APPETITE CHANGE: 0
EYE REDNESS: 0
SHORTNESS OF BREATH: 0
CHILLS: 0
SORE THROAT: 1
COUGH: 1

## 2022-12-08 NOTE — PROGRESS NOTES
Assessment & Plan   Sore throat  Mariana most likely contracted an upper respiratory viral infection because of her presenting symptoms of sore throat, ear pressure, rhinorrhea, and cough. Her rapid strep test was negative, so this is most likely not strep, making her symptoms more likely due to a virus. Less likely to be influenza because of the absence of fever, body aches, and malaise.   - Streptococcus A Rapid Screen w/Reflex to PCR  - Group A Streptococcus PCR Throat Swab  - Continue drinking plenty of fluids including tea with honey and resting  - If symptoms do not resolve or other symptoms start including fever, malaise, body aches in 7-10 days, consider testing for influenza and symptom management such as cough suppressant     Return if symptoms worsen or fail to improve.    Sandy Basilio MS3 on 12/8/2022 at 10:03 AM    I was present with the medical student who participated in the service and in the documentation of the note. I have verified the history and personally performed the physical exam and medical decision making. I agree with the assessment and plan of care as documented in the note.    Briana Francisco MD  [facility]    Subjective   Mariana is a 32 year old, presenting for the following health issues:  Pharyngitis (X 2 days, is waking her up at night even with taking medication, no fever or chills)  On 12/7, Mariana developed a mild sore throat. That night, she woke up around 3:00 am with a very painful sore throat so she took tylenol and was able to go back to sleep. She woke up again later that night and took another dose of tylenol. Both times, the tylenol was helpful. She rates the throat pain at a 7/10. Today, she is has a sore throat as well as pressure and crackling in the ears, a little bit of a runny nose, and some coughing and chest congestion. A Covid test this morning was negative. She has had no sick contacts, but does go to the gym and people at the gym have been sick. She  denies fever, chills, fatigue, appetite changes, vomiting, headache,rash and body aches.     Review of Systems   Constitutional: Negative for appetite change, chills, fatigue and fever.   HENT: Positive for ear pain, rhinorrhea and sore throat. Negative for ear discharge, sinus pressure, sinus pain, sneezing and trouble swallowing.    Eyes: Negative for redness and itching.   Respiratory: Positive for cough. Negative for shortness of breath.    Cardiovascular: Negative for chest pain and palpitations.   Gastrointestinal: Negative for abdominal pain and vomiting.   Skin: Negative for rash.         Objective    /87 (BP Location: Right arm, Patient Position: Sitting, Cuff Size: Adult Regular)   Pulse 86   Temp 97.9  F (36.6  C) (Skin)   Resp 12   Wt 60 kg (132 lb 4 oz)   SpO2 99%   BMI 21.03 kg/m    Body mass index is 21.03 kg/m .  Physical Exam  Constitutional:       Appearance: Normal appearance.   HENT:      Right Ear: Tympanic membrane normal.      Left Ear: Tympanic membrane normal.      Mouth/Throat:      Mouth: Mucous membranes are moist.      Pharynx: Posterior oropharyngeal erythema present.   Eyes:      Pupils: Pupils are equal, round, and reactive to light.   Cardiovascular:      Rate and Rhythm: Normal rate and regular rhythm.      Heart sounds: No murmur heard.    No friction rub. No gallop.   Pulmonary:      Effort: Pulmonary effort is normal.      Breath sounds: Normal breath sounds.   Neurological:      Mental Status: She is alert.       Results for orders placed or performed in visit on 12/08/22 (from the past 24 hour(s))   Streptococcus A Rapid Screen w/Reflex to PCR    Specimen: Throat; Swab   Result Value Ref Range    Group A Strep antigen Negative Negative       ----- Services Performed by a MEDICAL STUDENT in Presence of ATTENDING Physician-------

## 2022-12-08 NOTE — NURSING NOTE
32 year old  Chief Complaint   Patient presents with     Pharyngitis     X 2 days, is waking her up at night even with taking medication, no fever or chills       Blood pressure 118/87, pulse 86, temperature 97.9  F (36.6  C), temperature source Skin, resp. rate 12, weight 60 kg (132 lb 4 oz), SpO2 99 %, not currently breastfeeding. Body mass index is 21.03 kg/m .  Patient Active Problem List   Diagnosis     Anxiety and depression     IUD (intrauterine device) in place     Abdominal pain, generalized     Hemorrhoids       Wt Readings from Last 2 Encounters:   12/08/22 60 kg (132 lb 4 oz)   04/12/22 59.6 kg (131 lb 8 oz)     BP Readings from Last 3 Encounters:   12/08/22 118/87   04/12/22 124/84   03/19/21 121/84         Current Outpatient Medications   Medication     acetaminophen (TYLENOL) 325 MG tablet     buPROPion (WELLBUTRIN XL) 300 MG 24 hr tablet     lamoTRIgine (LAMICTAL) 150 MG tablet     levonorgestrel (MIRENA) 20 MCG/24HR IUD     Probiotic Product (PROBIOTIC-10 PO)     pantoprazole (PROTONIX) 40 MG EC tablet     No current facility-administered medications for this visit.       Social History     Tobacco Use     Smoking status: Never     Smokeless tobacco: Never   Vaping Use     Vaping Use: Never used   Substance Use Topics     Alcohol use: Yes     Drug use: Never       Health Maintenance Due   Topic Date Due     PAP  Never done     COVID-19 Vaccine (2 - Booster for Ronald series) 06/05/2021     INFLUENZA VACCINE (1) 09/01/2022     PHQ-9  12/16/2022       No results found for: PAP      December 8, 2022 8:51 AM     01-Jul-2021 05:22

## 2023-01-06 ENCOUNTER — LAB REQUISITION (OUTPATIENT)
Dept: LAB | Facility: CLINIC | Age: 33
End: 2023-01-06

## 2023-01-06 DIAGNOSIS — Z11.3 ENCOUNTER FOR SCREENING FOR INFECTIONS WITH A PREDOMINANTLY SEXUAL MODE OF TRANSMISSION: ICD-10-CM

## 2023-01-06 PROCEDURE — 87491 CHLMYD TRACH DNA AMP PROBE: CPT | Performed by: NURSE PRACTITIONER

## 2023-01-07 LAB
C TRACH DNA SPEC QL PROBE+SIG AMP: NEGATIVE
N GONORRHOEA DNA SPEC QL NAA+PROBE: NEGATIVE

## 2023-01-11 ENCOUNTER — LAB REQUISITION (OUTPATIENT)
Dept: LAB | Facility: CLINIC | Age: 33
End: 2023-01-11

## 2023-01-11 DIAGNOSIS — R30.0 DYSURIA: ICD-10-CM

## 2023-01-11 PROCEDURE — 87088 URINE BACTERIA CULTURE: CPT | Performed by: NURSE PRACTITIONER

## 2023-01-13 LAB — BACTERIA UR CULT: ABNORMAL

## 2023-01-31 ENCOUNTER — TRANSFERRED RECORDS (OUTPATIENT)
Dept: HEALTH INFORMATION MANAGEMENT | Facility: CLINIC | Age: 33
End: 2023-01-31
Payer: COMMERCIAL

## 2023-02-13 ENCOUNTER — TRANSFERRED RECORDS (OUTPATIENT)
Dept: HEALTH INFORMATION MANAGEMENT | Facility: CLINIC | Age: 33
End: 2023-02-13
Payer: COMMERCIAL

## 2023-03-02 ENCOUNTER — TRANSFERRED RECORDS (OUTPATIENT)
Dept: HEALTH INFORMATION MANAGEMENT | Facility: CLINIC | Age: 33
End: 2023-03-02
Payer: COMMERCIAL

## 2023-03-22 ENCOUNTER — TRANSFERRED RECORDS (OUTPATIENT)
Dept: HEALTH INFORMATION MANAGEMENT | Facility: CLINIC | Age: 33
End: 2023-03-22
Payer: COMMERCIAL

## 2023-03-31 ENCOUNTER — LAB REQUISITION (OUTPATIENT)
Dept: LAB | Facility: CLINIC | Age: 33
End: 2023-03-31
Payer: COMMERCIAL

## 2023-03-31 DIAGNOSIS — N39.0 URINARY TRACT INFECTION, SITE NOT SPECIFIED: ICD-10-CM

## 2023-03-31 PROCEDURE — 87086 URINE CULTURE/COLONY COUNT: CPT | Mod: ORL | Performed by: OBSTETRICS & GYNECOLOGY

## 2023-04-02 LAB — BACTERIA UR CULT: NO GROWTH

## 2023-05-18 ENCOUNTER — ANCILLARY PROCEDURE (OUTPATIENT)
Dept: GENERAL RADIOLOGY | Facility: CLINIC | Age: 33
End: 2023-05-18
Attending: PHYSICIAN ASSISTANT
Payer: COMMERCIAL

## 2023-05-18 ENCOUNTER — OFFICE VISIT (OUTPATIENT)
Dept: URGENT CARE | Facility: URGENT CARE | Age: 33
End: 2023-05-18
Payer: COMMERCIAL

## 2023-05-18 VITALS
HEART RATE: 88 BPM | DIASTOLIC BLOOD PRESSURE: 91 MMHG | RESPIRATION RATE: 16 BRPM | OXYGEN SATURATION: 100 % | TEMPERATURE: 97.2 F | SYSTOLIC BLOOD PRESSURE: 131 MMHG

## 2023-05-18 DIAGNOSIS — R07.9 CHEST PAIN, UNSPECIFIED TYPE: Primary | ICD-10-CM

## 2023-05-18 DIAGNOSIS — L72.9 CYST OF SKIN: ICD-10-CM

## 2023-05-18 PROCEDURE — 99214 OFFICE O/P EST MOD 30 MIN: CPT | Performed by: PHYSICIAN ASSISTANT

## 2023-05-18 PROCEDURE — 93000 ELECTROCARDIOGRAM COMPLETE: CPT | Performed by: PHYSICIAN ASSISTANT

## 2023-05-18 PROCEDURE — 71046 X-RAY EXAM CHEST 2 VIEWS: CPT | Mod: TC | Performed by: RADIOLOGY

## 2023-05-18 RX ORDER — LEVONORGESTREL 19.5 MG/1
INTRAUTERINE DEVICE INTRAUTERINE
COMMUNITY

## 2023-05-18 ASSESSMENT — ENCOUNTER SYMPTOMS
HEADACHES: 1
SHORTNESS OF BREATH: 0
FEVER: 0
WOUND: 0
PALPITATIONS: 1
COUGH: 0

## 2023-05-18 NOTE — PROGRESS NOTES
SUBJECTIVE:   Mariana Olguin is a 33 year old female presenting with a chief complaint of   Chief Complaint   Patient presents with     Chest Pain     Last few days, upper Lt chest and back, yesterday everything on upper chest was tight, racing heart rate, Rt hand has been feeling cold, no SOB, headaches, dry mouth       She is a new patient of [facility].  Patient presents with complaints of left side frontal and posterior pain, bilateral lower rib pain, cold extremities, palpitations, dry mouth, HA for a few days.  She also has complaints of her heart racing with simple activities.  Patient has many somatic complaints additionally - including leg pain, GI complaints, left shoulder pain.  Patient sees PT and neurologist.  States she has been evaluated by neurologist but no cause has been found.  Right forearm cyst for 7 days.      Treatment:  Tylenol a few days ago.    Review of Systems   Constitutional: Negative for fever.   Respiratory: Negative for cough and shortness of breath.    Cardiovascular: Positive for chest pain and palpitations.   Skin: Negative for wound.   Neurological: Positive for headaches.   All other systems reviewed and are negative.      Past Medical History:   Diagnosis Date     Anxiety and depression 3/19/2021     IUD (intrauterine device) in place 3/19/2021    Placed in 2020     Family History   Problem Relation Age of Onset     Hypertension Father      Breast Cancer Paternal Grandmother      Current Outpatient Medications   Medication Sig Dispense Refill     acetaminophen (TYLENOL) 325 MG tablet Take 325-650 mg by mouth every 6 hours as needed for mild pain       buPROPion (WELLBUTRIN XL) 300 MG 24 hr tablet Take 300 mg by mouth every morning       lamoTRIgine (LAMICTAL) 150 MG tablet Take 150 mg by mouth daily       levonorgestrel (KYLEENA) 19.5 MG IUD Kyleena 17.5 mcg/24 hrs (5yrs) 19.5mg intrauterine device   Take by intrauterine route.       levonorgestrel (MIRENA) 20 MCG/24HR IUD 1  each by Intrauterine route once       Probiotic Product (PROBIOTIC-10 PO)        Social History     Tobacco Use     Smoking status: Never     Smokeless tobacco: Never   Vaping Use     Vaping status: Never Used   Substance Use Topics     Alcohol use: Yes       OBJECTIVE  BP (!) 131/91   Pulse 88   Temp 97.2  F (36.2  C) (Oral)   Resp 16   SpO2 100%     Physical Exam  Vitals and nursing note reviewed.   Constitutional:       Appearance: Normal appearance. She is normal weight.   Eyes:      Extraocular Movements: Extraocular movements intact.      Conjunctiva/sclera: Conjunctivae normal.   Cardiovascular:      Rate and Rhythm: Normal rate and regular rhythm.      Pulses: Normal pulses.      Heart sounds: Normal heart sounds.   Pulmonary:      Effort: Pulmonary effort is normal.      Breath sounds: Normal breath sounds.   Chest:      Chest wall: Tenderness present.   Musculoskeletal:      Comments: Negative drew bilaterally.     Skin:     General: Skin is warm and dry.      Comments: Right forearm with 1 cm cyst, no erythema, nontender.   Neurological:      General: No focal deficit present.      Mental Status: She is alert.   Psychiatric:         Mood and Affect: Mood normal.         Behavior: Behavior normal.         Labs:  No results found for this or any previous visit (from the past 24 hour(s)).    X-Ray was done, my findings are: NAD  EKG:  NSR, 84HR.    ASSESSMENT:      ICD-10-CM    1. Chest pain, unspecified type  R07.9 EKG 12-lead complete w/read - Clinics     XR Chest 2 Views           Medical Decision Making:    Differential Diagnosis:  Anxiety, arrhythmia, pneumonothorax    Serious Comorbid Conditions:  Adult:  reviewed    PLAN:  Patient can continue with tylenol prn.  Patient reassurance.  US ordered for right forearm cyst.  Discussed reasons to seek immediate medical attention.  Additionally if no improvement or worsening in one week, may follow up with PCP and/or UC.        Followup:    If not  improving or if condition worsens, follow up with your Primary Care Provider, If not improving or if conditions worsens over the next 12-24 hours, go to the Emergency Department    There are no Patient Instructions on file for this visit.

## 2023-05-18 NOTE — ADDENDUM NOTE
Addended by: BRIJESH MCMULLEN on: 5/18/2023 11:47 AM     Modules accepted: Orders, Level of Service

## 2023-05-30 ENCOUNTER — ANCILLARY PROCEDURE (OUTPATIENT)
Dept: ULTRASOUND IMAGING | Facility: CLINIC | Age: 33
End: 2023-05-30
Attending: PHYSICIAN ASSISTANT
Payer: COMMERCIAL

## 2023-05-30 DIAGNOSIS — L72.9 CYST OF SKIN: ICD-10-CM

## 2023-05-30 PROCEDURE — 76882 US LMTD JT/FCL EVL NVASC XTR: CPT | Mod: RT

## 2023-06-28 NOTE — PROGRESS NOTES
SUBJECTIVE:   CC: Mariana is an 33 year old who presents for preventive health visit.     Healthy Habits:     Getting at least 3 servings of Calcium per day:  NO    Bi-annual eye exam:  Yes    Dental care twice a year:  Yes    Sleep apnea or symptoms of sleep apnea:  None    Diet:  Other    Frequency of exercise:  2-3 days/week    Duration of exercise:  30-45 minutes    Taking medications regularly:  Yes    Medication side effects:  None    PHQ-2 Total Score: 3    Additional concerns today:  Yes     # Health Maintenance  - BP:   BP Readings from Last 3 Encounters:   06/29/23 119/79   05/18/23 (!) 131/91   12/08/22 118/87   - Cholesterol: pending  Recent Labs   Lab Test 04/12/22  1036   CHOL 227*   HDL 63   *   TRIG 52   The ASCVD Risk score (Winnie WEIR, et al., 2019) failed to calculate for the following reasons:    The 2019 ASCVD risk score is only valid for ages 40 to 79  - Diabetes Screening: pending  - Lung Cancer Screening: not indicated  55-81yo w/30py smoking history and currently smoking OR quit within past 15 years:  Low dose CT annually and discontinued once a person has been 15 years tobacco free  - (+) seatbelt use, (+) helmet, (+) smoke detector  - Feels safe at home, denies verbal/physical/emotional abuse in past year: yes  - Last Pap: 8/2020, NILM      PROBLEMS TO ADD ON...  Chronic Joint Pain  - notes she has had extreme joint laxity her whole life but as a result is more prone to injury and chronic pain  - has been doing some reading online and wonders if she might have EDS    Upset stomach  - chronic  - thinks this started after she had a hormonal IUD placed.   - she is considering replacing the IUD with a Paraguard  - she notes she would need sedation for this procedure  - is scheduled to meet with her OBGYN soon to discuss this    Headaches  - LEFT temple  - has seen neurology through [facility]  - not currently taking medication regularly for this  - at this point, she notices  the headaches, but they are seldom debilitating    Slight swelling at medial RIGHT wrist  - for the past month or so  - minimal discomfort  - works as a  so this can occasionally be irritating  - no distal weakness or paresthesia      Social History     Tobacco Use     Smoking status: Never     Smokeless tobacco: Never   Substance Use Topics     Alcohol use: Yes     Reviewed orders with patient.  Reviewed health maintenance and updated orders accordingly - Yes      Breast Cancer Screening:  Any new diagnosis of family breast, ovarian, or bowel cancer? No    Patient under 40 years of age: Routine Mammogram Screening not recommended.   Pertinent mammograms are reviewed under the imaging tab.    History of abnormal Pap smear: NO - age 30-65 PAP every 5 years with negative HPV co-testing recommended     Reviewed and updated as needed this visit by clinical staff   Tobacco  Allergies  Meds  Problems  Med Hx  Surg Hx  Fam Hx          Reviewed and updated as needed this visit by Provider   Tobacco  Allergies  Meds  Problems  Med Hx  Surg Hx  Fam Hx         Past Medical History:   Diagnosis Date     Anxiety and depression 3/19/2021     IUD (intrauterine device) in place 3/19/2021    Placed in 2020      Past Surgical History:   Procedure Laterality Date     DENTAL SURGERY      [place] Teeth       Review of Systems   Constitutional: Negative for chills and fever.   HENT: Negative for congestion, ear pain, hearing loss and sore throat.    Eyes: Negative for pain and visual disturbance.   Respiratory: Negative for cough and shortness of breath.    Cardiovascular: Negative for chest pain, palpitations and peripheral edema.   Gastrointestinal: Positive for abdominal pain, constipation and nausea. Negative for diarrhea, heartburn and hematochezia.   Breasts:  Negative for tenderness, breast mass and discharge.   Genitourinary: Positive for pelvic pain. Negative for dysuria, frequency, genital sores, hematuria,  "urgency, vaginal bleeding and vaginal discharge.   Musculoskeletal: Positive for arthralgias, joint swelling and myalgias.   Skin: Negative for rash.   Neurological: Positive for headaches. Negative for dizziness, weakness and paresthesias.   Psychiatric/Behavioral: Negative for mood changes. The patient is nervous/anxious.         OBJECTIVE:   /79 (BP Location: Right arm, Patient Position: Sitting, Cuff Size: Adult Regular)   Pulse 106   Temp 98.3  F (36.8  C) (Skin)   Resp 15   Ht 1.689 m (5' 6.5\")   Wt 58.1 kg (128 lb)   SpO2 99%   BMI 20.35 kg/m    Physical Exam  GENERAL: healthy, alert and no distress  NECK: no adenopathy, no asymmetry, masses, or scars and thyroid normal to palpation  RESP: lungs clear to auscultation - no rales, rhonchi or wheezes  CV: regular rate and rhythm, normal S1 S2, no S3 or S4, no murmur, click or rub, no peripheral edema and peripheral pulses strong  ABDOMEN: soft, nontender, no hepatosplenomegaly, no masses and bowel sounds normal  MS: ganglion cyst at RIGHT wrist    Diagnostic Test Results:  Labs reviewed in Epic    ASSESSMENT/PLAN:   Mariana was seen today for physical.    Diagnoses and all orders for this visit:    Routine general medical examination at a health care facility    Screening for lipid disorders  -     Lipid Panel; Future  -     Lipid Panel    Screening for diabetes mellitus  -     Basic Metabolic Panel; Future  -     Hemoglobin A1c; Future  -     Basic Metabolic Panel  -     Hemoglobin A1c    Joint laxity  -     Adult Genetics & Metabolism Referral    Ganglion cyst of wrist, right    Chronic upset stomach    Chronic nonintractable headache, unspecified headache type    No plans for acute change to headache management today. Would defer to neurology as previously established if symptoms worsen.    Lengthy discussion about joint pain and laxity. Advised there is no curative treatment for EDS so genetic testing may not be particularly helpful beyond " edification but Mariana would like to proceed. In the meantime, we reviewed online literature discussing management of chronic joint laxity and pain.     Reassured Mariana there is no urgent concern for the ganglion cyst. Will continue to monitor.     Discussed possibly replacing hormonal IUD with Paraguard. Mariana will contact her OBGYN for possible procedure given her need for sedation with procedure.    Patient has been advised of split billing requirements and indicates understanding: Yes      COUNSELING:  Reviewed preventive health counseling, as reflected in patient instructions        She reports that she has never smoked. She has never used smokeless tobacco.      MD NAZIA Presley [facility]

## 2023-06-29 ENCOUNTER — OFFICE VISIT (OUTPATIENT)
Dept: FAMILY MEDICINE | Facility: CLINIC | Age: 33
End: 2023-06-29
Payer: COMMERCIAL

## 2023-06-29 VITALS
DIASTOLIC BLOOD PRESSURE: 79 MMHG | HEIGHT: 67 IN | WEIGHT: 128 LBS | SYSTOLIC BLOOD PRESSURE: 119 MMHG | HEART RATE: 106 BPM | BODY MASS INDEX: 20.09 KG/M2 | RESPIRATION RATE: 15 BRPM | OXYGEN SATURATION: 99 % | TEMPERATURE: 98.3 F

## 2023-06-29 DIAGNOSIS — Z13.220 SCREENING FOR LIPID DISORDERS: ICD-10-CM

## 2023-06-29 DIAGNOSIS — M25.20 JOINT LAXITY: ICD-10-CM

## 2023-06-29 DIAGNOSIS — M67.431 GANGLION CYST OF WRIST, RIGHT: ICD-10-CM

## 2023-06-29 DIAGNOSIS — G89.29 CHRONIC NONINTRACTABLE HEADACHE, UNSPECIFIED HEADACHE TYPE: ICD-10-CM

## 2023-06-29 DIAGNOSIS — Z00.00 ROUTINE GENERAL MEDICAL EXAMINATION AT A HEALTH CARE FACILITY: Primary | ICD-10-CM

## 2023-06-29 DIAGNOSIS — Z13.1 SCREENING FOR DIABETES MELLITUS: ICD-10-CM

## 2023-06-29 DIAGNOSIS — R51.9 CHRONIC NONINTRACTABLE HEADACHE, UNSPECIFIED HEADACHE TYPE: ICD-10-CM

## 2023-06-29 DIAGNOSIS — K30 CHRONIC UPSET STOMACH: ICD-10-CM

## 2023-06-29 LAB
ANION GAP SERPL CALCULATED.3IONS-SCNC: 11 MMOL/L (ref 7–15)
BUN SERPL-MCNC: 7.6 MG/DL (ref 6–20)
CALCIUM SERPL-MCNC: 9.3 MG/DL (ref 8.6–10)
CHLORIDE SERPL-SCNC: 102 MMOL/L (ref 98–107)
CHOLEST SERPL-MCNC: 204 MG/DL
CREAT SERPL-MCNC: 0.73 MG/DL (ref 0.51–0.95)
DEPRECATED HCO3 PLAS-SCNC: 25 MMOL/L (ref 22–29)
GFR SERPL CREATININE-BSD FRML MDRD: >90 ML/MIN/1.73M2
GLUCOSE SERPL-MCNC: 95 MG/DL (ref 70–99)
HBA1C MFR BLD: 5.3 % (ref 0–5.6)
HDLC SERPL-MCNC: 60 MG/DL
LDLC SERPL CALC-MCNC: 132 MG/DL
NONHDLC SERPL-MCNC: 144 MG/DL
POTASSIUM SERPL-SCNC: 4.1 MMOL/L (ref 3.4–5.3)
SODIUM SERPL-SCNC: 138 MMOL/L (ref 136–145)
TRIGL SERPL-MCNC: 60 MG/DL

## 2023-06-29 PROCEDURE — 80048 BASIC METABOLIC PNL TOTAL CA: CPT | Performed by: FAMILY MEDICINE

## 2023-06-29 PROCEDURE — 80061 LIPID PANEL: CPT | Performed by: FAMILY MEDICINE

## 2023-06-29 ASSESSMENT — ENCOUNTER SYMPTOMS
CONSTIPATION: 1
FEVER: 0
CHILLS: 0
WEAKNESS: 0
PARESTHESIAS: 0
DIARRHEA: 0
PALPITATIONS: 0
MYALGIAS: 1
DYSURIA: 0
HEARTBURN: 0
BREAST MASS: 0
NAUSEA: 1
ABDOMINAL PAIN: 1
SORE THROAT: 0
SHORTNESS OF BREATH: 0
COUGH: 0
EYE PAIN: 0
HEMATURIA: 0
HEADACHES: 1
HEMATOCHEZIA: 0
NERVOUS/ANXIOUS: 1
DIZZINESS: 0
FREQUENCY: 0
JOINT SWELLING: 1
ARTHRALGIAS: 1

## 2023-06-29 ASSESSMENT — ANXIETY QUESTIONNAIRES
IF YOU CHECKED OFF ANY PROBLEMS ON THIS QUESTIONNAIRE, HOW DIFFICULT HAVE THESE PROBLEMS MADE IT FOR YOU TO DO YOUR WORK, TAKE CARE OF THINGS AT HOME, OR GET ALONG WITH OTHER PEOPLE: SOMEWHAT DIFFICULT
5. BEING SO RESTLESS THAT IT IS HARD TO SIT STILL: NOT AT ALL
GAD7 TOTAL SCORE: 4
3. WORRYING TOO MUCH ABOUT DIFFERENT THINGS: SEVERAL DAYS
2. NOT BEING ABLE TO STOP OR CONTROL WORRYING: SEVERAL DAYS
GAD7 TOTAL SCORE: 4
6. BECOMING EASILY ANNOYED OR IRRITABLE: NOT AT ALL
1. FEELING NERVOUS, ANXIOUS, OR ON EDGE: SEVERAL DAYS
7. FEELING AFRAID AS IF SOMETHING AWFUL MIGHT HAPPEN: SEVERAL DAYS

## 2023-06-29 ASSESSMENT — PATIENT HEALTH QUESTIONNAIRE - PHQ9
SUM OF ALL RESPONSES TO PHQ QUESTIONS 1-9: 3
5. POOR APPETITE OR OVEREATING: NOT AT ALL

## 2023-06-29 NOTE — NURSING NOTE
"33 year old  Chief Complaint   Patient presents with     Physical     Pt reports feeling sick for three days with gastrointestinal issues and chronic headaches. Requests a referral for [facility].        Blood pressure 119/79, pulse 106, temperature 98.3  F (36.8  C), temperature source Skin, resp. rate 15, height 1.689 m (5' 6.5\"), weight 58.1 kg (128 lb), SpO2 99 %, not currently breastfeeding. Body mass index is 20.35 kg/m .  Patient Active Problem List   Diagnosis     Anxiety and depression     IUD (intrauterine device) in place     Hemorrhoids     ADD (attention deficit disorder)       Wt Readings from Last 2 Encounters:   06/29/23 58.1 kg (128 lb)   12/08/22 60 kg (132 lb 4 oz)     BP Readings from Last 3 Encounters:   06/29/23 119/79   05/18/23 (!) 131/91   12/08/22 118/87         Current Outpatient Medications   Medication     acetaminophen (TYLENOL) 325 MG tablet     buPROPion (WELLBUTRIN XL) 300 MG 24 hr tablet     lamoTRIgine (LAMICTAL) 150 MG tablet     levonorgestrel (KYLEENA) 19.5 MG IUD     levonorgestrel (MIRENA) 20 MCG/24HR IUD     Probiotic Product (PROBIOTIC-10 PO)     No current facility-administered medications for this visit.       Social History     Tobacco Use     Smoking status: Never     Smokeless tobacco: Never   Vaping Use     Vaping Use: Never used   Substance Use Topics     Alcohol use: Yes     Drug use: Never       Health Maintenance Due   Topic Date Due     PAP  Never done     COVID-19 Vaccine (2 - Booster for Ronald series) 06/05/2021       No results found for: PAP      June 29, 2023 8:12 AM    "

## 2023-07-11 ENCOUNTER — OFFICE VISIT (OUTPATIENT)
Dept: FAMILY MEDICINE | Facility: CLINIC | Age: 33
End: 2023-07-11
Payer: COMMERCIAL

## 2023-07-11 VITALS
TEMPERATURE: 98.4 F | RESPIRATION RATE: 19 BRPM | HEART RATE: 98 BPM | OXYGEN SATURATION: 98 % | DIASTOLIC BLOOD PRESSURE: 82 MMHG | SYSTOLIC BLOOD PRESSURE: 120 MMHG

## 2023-07-11 DIAGNOSIS — R51.9 NONINTRACTABLE EPISODIC HEADACHE, UNSPECIFIED HEADACHE TYPE: ICD-10-CM

## 2023-07-11 DIAGNOSIS — R53.83 FATIGUE, UNSPECIFIED TYPE: ICD-10-CM

## 2023-07-11 DIAGNOSIS — M25.50 MULTIPLE JOINT PAIN: Primary | ICD-10-CM

## 2023-07-11 DIAGNOSIS — R11.0 NAUSEA: ICD-10-CM

## 2023-07-11 LAB
CRP SERPL-MCNC: <3 MG/L
ERYTHROCYTE [SEDIMENTATION RATE] IN BLOOD BY WESTERGREN METHOD: 9 MM/HR (ref 0–20)

## 2023-07-11 PROCEDURE — 86618 LYME DISEASE ANTIBODY: CPT | Performed by: FAMILY MEDICINE

## 2023-07-11 PROCEDURE — 85652 RBC SED RATE AUTOMATED: CPT | Performed by: FAMILY MEDICINE

## 2023-07-11 PROCEDURE — 86200 CCP ANTIBODY: CPT | Performed by: FAMILY MEDICINE

## 2023-07-11 PROCEDURE — 86038 ANTINUCLEAR ANTIBODIES: CPT | Performed by: FAMILY MEDICINE

## 2023-07-11 PROCEDURE — 86140 C-REACTIVE PROTEIN: CPT | Performed by: FAMILY MEDICINE

## 2023-07-11 PROCEDURE — 86431 RHEUMATOID FACTOR QUANT: CPT | Performed by: FAMILY MEDICINE

## 2023-07-11 NOTE — PROGRESS NOTES
Assessment & Plan   Problem List Items Addressed This Visit    None  Visit Diagnoses     Multiple joint pain    -  Primary    Relevant Orders    Lyme Disease Total Abs Bld with Reflex to Confirm CLIA    Lead Capillary    OH HEAVY METAL SCREEN    Cyclic Citrullinated Peptide Antibody IgG    Rheumatoid factor    Anti Nuclear Yessenia IgG by IFA with Reflex    DNA double stranded antibodies    Erythrocyte sedimentation rate auto (Completed)    CRP inflammation (Completed)    HLA-B27 Typing    Fatigue, unspecified type        Relevant Orders    Lyme Disease Total Abs Bld with Reflex to Confirm CLIA    Lead Capillary    OH HEAVY METAL SCREEN    Cyclic Citrullinated Peptide Antibody IgG    Rheumatoid factor    Anti Nuclear Yessenia IgG by IFA with Reflex    DNA double stranded antibodies    Erythrocyte sedimentation rate auto (Completed)    CRP inflammation (Completed)    HLA-B27 Typing    Nausea        Relevant Orders    Lyme Disease Total Abs Bld with Reflex to Confirm CLIA    Lead Capillary    OH HEAVY METAL SCREEN    Cyclic Citrullinated Peptide Antibody IgG    Rheumatoid factor    Anti Nuclear Yessenia IgG by IFA with Reflex    DNA double stranded antibodies    Erythrocyte sedimentation rate auto (Completed)    CRP inflammation (Completed)    HLA-B27 Typing    Nonintractable episodic headache, unspecified headache type        Relevant Orders    Lyme Disease Total Abs Bld with Reflex to Confirm CLIA    Lead Capillary    OH HEAVY METAL SCREEN    Cyclic Citrullinated Peptide Antibody IgG    Rheumatoid factor    Anti Nuclear Yessenia IgG by IFA with Reflex    DNA double stranded antibodies    Erythrocyte sedimentation rate auto (Completed)    CRP inflammation (Completed)    HLA-B27 Typing         Labs as noted above. Suspicion for possible fibromyalgia, but at this point will follow up results as indicated.     23 minutes spent on the date of the encounter doing chart review, history and exam, documentation and further activities as  "noted.    Loco Puckett MD  [facility]    Subjective   Mariana is a 33 year old, presenting for the following health issues:  Consult (Discuss testing for Lyme and lead toxicity/heavy metal (painting houses), has been sick for a few years and unsure of etiology. )    HPI   \"discuss testing for Lymes\"  - chronic joint pain at multiple sites, fatigue, headache, nausea  - would like testing for possible causes, including Lyme  - no memory of tick bite, or rash  - she is also a  and wonders if she may have been exposed to lead/heavy metals        Review of Systems   Constitutional, HEENT, cardiovascular, pulmonary, gi and gu systems are negative, except as otherwise noted.      Objective    /82 (BP Location: Left arm, Patient Position: Sitting, Cuff Size: Adult Regular)   Pulse 98   Temp 98.4  F (36.9  C) (Temporal)   Resp 19   SpO2 98%   There is no height or weight on file to calculate BMI.  Physical Exam   GENERAL: healthy, alert and no distress  NECK: no adenopathy, no asymmetry, masses, or scars and thyroid normal to palpation  RESP: lungs clear to auscultation - no rales, rhonchi or wheezes  CV: regular rate and rhythm, normal S1 S2, no S3 or S4, no murmur, click or rub, no peripheral edema and peripheral pulses strong  ABDOMEN: soft, nontender, no hepatosplenomegaly, no masses and bowel sounds normal  MS: no gross musculoskeletal defects noted, no edema          "

## 2023-07-11 NOTE — NURSING NOTE
33 year old  Chief Complaint   Patient presents with     Consult     Discuss testing for Lyme and lead toxicity/heavy metal (painting houses), has been sick for a few years and unsure of etiology.        Blood pressure 120/82, pulse 98, temperature 98.4  F (36.9  C), temperature source Temporal, resp. rate 19, SpO2 98 %, not currently breastfeeding. There is no height or weight on file to calculate BMI.  Patient Active Problem List   Diagnosis     Anxiety and depression     IUD (intrauterine device) in place     ADD (attention deficit disorder)     Intractable headache       Wt Readings from Last 2 Encounters:   06/29/23 58.1 kg (128 lb)   12/08/22 60 kg (132 lb 4 oz)     BP Readings from Last 3 Encounters:   07/11/23 120/82   06/29/23 119/79   05/18/23 (!) 131/91         Current Outpatient Medications   Medication     acetaminophen (TYLENOL) 325 MG tablet     buPROPion (WELLBUTRIN XL) 300 MG 24 hr tablet     lamoTRIgine (LAMICTAL) 150 MG tablet     levonorgestrel (KYLEENA) 19.5 MG IUD     Probiotic Product (PROBIOTIC-10 PO)     levonorgestrel (MIRENA) 20 MCG/24HR IUD     No current facility-administered medications for this visit.       Social History     Tobacco Use     Smoking status: Never     Smokeless tobacco: Never   Vaping Use     Vaping Use: Never used   Substance Use Topics     Alcohol use: Yes     Drug use: Never       Health Maintenance Due   Topic Date Due     PAP  Never done     COVID-19 Vaccine (2 - Booster for Ronald series) 06/05/2021       No results found for: PAP      July 11, 2023 4:45 PM

## 2023-07-12 LAB
ANA SER QL IF: NEGATIVE
B BURGDOR IGG+IGM SER QL: 0.14
RHEUMATOID FACT SER NEPH-ACNC: <6 IU/ML

## 2023-07-14 LAB — CCP AB SER IA-ACNC: 2 U/ML

## 2023-07-20 NOTE — PROGRESS NOTES
"Mariana Olguin is a 33 year old female with hx of anxiety, depression, ADD, headaches. She is a patient of Dr. Puckett's she is here for the following issues:    Abdominal complaints  Mariana reports she has been sick with GI symptoms for about 4 years. In the first year, she was sick to her stomach and lost a lot of weight. She had 4 colonoscopies without finding the cause. She first saw Dr. Brooks for this, then JEFF. She notes she was placed on acid reducing medication she doesn't believe she should have been on for as long as she was and at as high of a dose. She stopped this medication and worked on slow improvement of her diet. She takes a fiber supplement. Her stomach upset has improved over the last 2 years or so and she is now close to a full diet. She notes that the plan with JEFF was to add medication, anti-spasmodics, but she preferred to try stopping the medication as she had read online that the acid reducing medication she was on should be short-term and at a lower dose. She had been on 40 mg twice daily, and saw online the appropriate dose was only 20 mg daily. On occasion, her stomach will bloat to the point where it looks \"like I'm 5-6 months pregnant\" and this is painful. She has a history of ulcers.       Concern for Giovanni-Danlos Syndrome  Mariana notes soreness of her left shoulder and surrounding areas as well as rib pain both posteriorly and anteriorly. She follows with a chiropractor who will \"pop my ribs back into place\" along with other treatments. Her chiropractor has mentioned to her that she is hypermobile. She cannot \"stretch too enthusiastically\" or her ribs will pop out of place. She reports she gets injured \"all the time\" and these heal slowly. She reports right wrist swelling and pain present for the past 3 months or so triggered by painting. She is a  and right-handed. She also notes arthritis-type pain in her arms and notes that her feet have always hurt. She states that in " "the last few weeks, her pain has been elevated and present in her whole body. She wonders if she could have Giovanni-Danlos syndrome. She has no known family history of this. She has tested negative for Lyme disease. CRP, ESR, KATELYN, CCP, and rheumatoid factor were normal on 7/11/2023. She is also interested in getting an allergy test.     Anxiety/Depression  Mariana is in therapy once weekly, which she notes is also for trauma \"just in case.\" She also does acupuncture and tries to meditate. She has not meditated in the past couple of weeks due to increased stress.    Headache  Mariana reports an intermittent stinging headache at the left side of her head which she describes as a sharp pain, worse recently. She also had a period where she was shaking badly enough she was dropping things. She followed with a neurologist who she reports would like her to get $4,000 worth of tests to get a general view of her health as they were unable to figure out the underlying cause so far, but she does not have the money for this and is \"taking a break\" from this neurologist.     Infections  Over the past year or so, Mariana has gotten \"a bunch of weird infections,\" including an eye infection and a few UTIs. Although she only had 1 UTI test positive, the others also got taken care of by medication. She notes that the UTI that tested positive was a type of bacteria that generally occurs in people catheterized in the hospital.     Swollen Tonsils  Mariana reports swollen tonsils, right > left, which have worsened over the past month. She reports difficulty swallowing. She followed with Urgent Care to check for strep a few weeks ago and was negative. Her right tonsil has been a little sore over the last few days. She also has had some ear pressure and popping. She has never seen food stuck in her tonsils. Denies sore throat. No known fevers, though she is warm intermittently. She notes that she went down an internet \"rabbit hole\" and is " "concerned for lymphoma, as she seems to have the symptoms that were listed for this. No drenching sweats or fevers.     Patient Active Problem List   Diagnosis    Anxiety and depression    IUD (intrauterine device) in place    ADD (attention deficit disorder)    Intractable headache       Current Outpatient Medications   Medication Sig Dispense Refill    acetaminophen (TYLENOL) 325 MG tablet Take 325-650 mg by mouth every 6 hours as needed for mild pain      buPROPion (WELLBUTRIN XL) 300 MG 24 hr tablet Take 300 mg by mouth every morning      lamoTRIgine (LAMICTAL) 150 MG tablet Take 150 mg by mouth daily      levonorgestrel (KYLEENA) 19.5 MG IUD Kyleena 17.5 mcg/24 hrs (5yrs) 19.5mg intrauterine device   Take by intrauterine route.      Probiotic Product (PROBIOTIC-10 PO)          Allergies   Allergen Reactions    Amoxicillin      Rash when child      Dust Mites     Mold     Penicillins         EXAM  /79 (BP Location: Left arm, Patient Position: Sitting, Cuff Size: Adult Regular)   Pulse 99   Temp 98  F (36.7  C) (Skin)   Resp 15   Ht 1.689 m (5' 6.5\")   Wt 58.1 kg (128 lb)   SpO2 100%   BMI 20.35 kg/m    Gen: Alert, pleasant, somewhat tearful during our visit  HEENT:  Conjunctiva nl, TM normal bilaterally, bilateral crypts in tonsillar glands, no exudate, no erythema or edema.   Neck: No lymphadenopathy  Axillae: No palpable adenopathy.   Abdomen: No HSM  Right Wrist: Mild edema at the volar aspect of the wrist, radial side. No redness, warmth, or tenderness.       Assessment:  (J35.8) Cryptic tonsil  (primary encounter diagnosis)  Comment: Presence of crypts bilaterally, she has increased sense of inflammation on the right. No current concern for tonsillitis.   Plan: Discussed diagnosis of cryptic tonsils, management with warm salt water gargles to prevent trapping of food. Handout given. If she has recurrent symptoms of tonsillitis, then could consider referral to ENT. No indication for that at this " time.     (M25.50) Diffuse arthralgia  Comment: Longstanding constellation of joint aches and pains. Hypermobility. She is concerned about Giovanni-Danlos syndrome.   Plan: Adult Physical Medicine and Rehab          Referral        Discussed recommendation to see a rheumatologist to put her story together. She will check with insurance regarding rheumatology clinics in her network and will send us information so we can place the referral. Also referred to PM&R for their input.     (R10.84) Abdominal pain, generalized  Comment: 4 year history of various abdominal complaints, under the care of a gastroenterologist. She was given Rx for omeprazole 40mg bid but stopped it due to concern the dose was too high  Plan: recommend she meet with her GI doctor to discuss her concerns.     Will discuss today's visit with her primary physician, Dr. Puckett.     35 minutes spend on the date of this encounter doing chart review, history and exam, documentation and further activities as noted above.     I have reviewed, edited and approved the above scribed note.    Mayela Edmond MD  Internal Medicine/Pediatrics      I, Cathleen Clifton, am serving as a scribe to document services personally performed by Dr. Mayela Edmond, based on data collection and the provider's statements to me.

## 2023-07-21 ENCOUNTER — OFFICE VISIT (OUTPATIENT)
Dept: FAMILY MEDICINE | Facility: CLINIC | Age: 33
End: 2023-07-21
Payer: COMMERCIAL

## 2023-07-21 VITALS
DIASTOLIC BLOOD PRESSURE: 79 MMHG | OXYGEN SATURATION: 100 % | TEMPERATURE: 98 F | BODY MASS INDEX: 20.09 KG/M2 | HEART RATE: 99 BPM | SYSTOLIC BLOOD PRESSURE: 112 MMHG | RESPIRATION RATE: 15 BRPM | HEIGHT: 67 IN | WEIGHT: 128 LBS

## 2023-07-21 DIAGNOSIS — R10.84 ABDOMINAL PAIN, GENERALIZED: ICD-10-CM

## 2023-07-21 DIAGNOSIS — M25.50 DIFFUSE ARTHRALGIA: ICD-10-CM

## 2023-07-21 DIAGNOSIS — J35.8 CRYPTIC TONSIL: Primary | ICD-10-CM

## 2023-07-21 NOTE — Clinical Note
Sesar HEAD about your patient. I referred her to PM&R to check out musculoskeletal complaints, concern for Giovanni-Danlos.  Mayela

## 2023-07-21 NOTE — NURSING NOTE
"33 year old  Chief Complaint   Patient presents with     Pharyngitis     Pt reports concerns about swollen tonsils which has worsened over the past month.        Blood pressure 112/79, pulse 99, temperature 98  F (36.7  C), temperature source Skin, resp. rate 15, height 1.689 m (5' 6.5\"), weight 58.1 kg (128 lb), SpO2 100 %, not currently breastfeeding. Body mass index is 20.35 kg/m .  Patient Active Problem List   Diagnosis     Anxiety and depression     IUD (intrauterine device) in place     ADD (attention deficit disorder)     Intractable headache       Wt Readings from Last 2 Encounters:   07/21/23 58.1 kg (128 lb)   06/29/23 58.1 kg (128 lb)     BP Readings from Last 3 Encounters:   07/21/23 112/79   07/11/23 120/82   06/29/23 119/79         Current Outpatient Medications   Medication     acetaminophen (TYLENOL) 325 MG tablet     buPROPion (WELLBUTRIN XL) 300 MG 24 hr tablet     lamoTRIgine (LAMICTAL) 150 MG tablet     levonorgestrel (KYLEENA) 19.5 MG IUD     Probiotic Product (PROBIOTIC-10 PO)     No current facility-administered medications for this visit.       Social History     Tobacco Use     Smoking status: Never     Smokeless tobacco: Never   Vaping Use     Vaping Use: Never used   Substance Use Topics     Alcohol use: Yes     Drug use: Never       Health Maintenance Due   Topic Date Due     PAP  Never done     COVID-19 Vaccine (2 - Booster for Ronald series) 06/05/2021       No results found for: PAP      July 21, 2023 7:53 AM    "

## 2023-07-21 NOTE — PATIENT INSTRUCTIONS
Contact your insurance regarding rheumatology  [facility] ?  Community, other than [facility]    Physical medicine and rehab physicians

## 2023-07-24 ENCOUNTER — TELEPHONE (OUTPATIENT)
Dept: PHYSICAL MEDICINE AND REHAB | Facility: CLINIC | Age: 33
End: 2023-07-24

## 2023-07-24 NOTE — TELEPHONE ENCOUNTER
[facility] Call Center    Phone Message    May a detailed message be left on voicemail: yes     Reason for Call: Other: Patient is calling requesting to schedule referral appointment. Diagnosis is not listed in writers protocols for scheduling. Please review and call patient back to schedule.      Action Taken: Message routed to:  [facility]    Travel Screening: Not Applicable

## 2023-07-24 NOTE — TELEPHONE ENCOUNTER
Per visit of 7/21/23 in which the referral to PM & R was made:    (M25.50) Diffuse arthralgia  Comment: Longstanding constellation of joint aches and pains. Hypermobility. She is concerned about Giovanni-Danlos syndrome.   Plan: Adult Physical Medicine and Rehab          Referral        Discussed recommendation to see a rheumatologist to put her story together. She will check with insurance regarding rheumatology clinics in her network and will send us information so we can place the referral. Also referred to PM&R for their input.      Will discuss today's visit with her primary physician, Dr. Puckett.         I have reviewed, edited and approved the above scribed note.     Mayela Edmond MD  Internal Medicine/Pediatrics    RN Triage:    Physical Medicine and Rehab cannot give Dx of EDS, we can review her functional needs (ADLs) and make additional referral to rehab therapies (PT & OT), or make recommendations for any assistive device, or DME for examples, if indicated.      Pt could be booked with General PM & R provider Thierry rTujillo or Frandy Skinner with the understanding we will also recommend that she see Rheumatology to have her condition reviewed for a formal Dx.    Next available NEW opening is appropriate.  Please let pt know of statement as underlined above.    Emily Victoria, RN on 7/24/2023 at 1:44 PM

## 2023-11-03 ENCOUNTER — LAB REQUISITION (OUTPATIENT)
Dept: LAB | Facility: CLINIC | Age: 33
End: 2023-11-03
Payer: COMMERCIAL

## 2023-11-03 DIAGNOSIS — R30.0 DYSURIA: ICD-10-CM

## 2023-11-03 PROCEDURE — 87086 URINE CULTURE/COLONY COUNT: CPT | Performed by: OBSTETRICS & GYNECOLOGY

## 2023-11-04 LAB — BACTERIA UR CULT: ABNORMAL

## 2023-11-17 ENCOUNTER — OFFICE VISIT (OUTPATIENT)
Dept: FAMILY MEDICINE | Facility: CLINIC | Age: 33
End: 2023-11-17
Payer: COMMERCIAL

## 2023-11-17 VITALS
SYSTOLIC BLOOD PRESSURE: 121 MMHG | OXYGEN SATURATION: 96 % | DIASTOLIC BLOOD PRESSURE: 87 MMHG | TEMPERATURE: 99 F | RESPIRATION RATE: 15 BRPM | HEART RATE: 91 BPM

## 2023-11-17 DIAGNOSIS — J02.9 PHARYNGITIS, UNSPECIFIED ETIOLOGY: Primary | ICD-10-CM

## 2023-11-17 LAB
DEPRECATED S PYO AG THROAT QL EIA: NEGATIVE
FLUAV RNA SPEC QL NAA+PROBE: NEGATIVE
FLUBV RNA RESP QL NAA+PROBE: NEGATIVE
GROUP A STREP BY PCR: NOT DETECTED
RSV RNA SPEC NAA+PROBE: NEGATIVE
SARS-COV-2 RNA RESP QL NAA+PROBE: NEGATIVE

## 2023-11-17 PROCEDURE — 87637 SARSCOV2&INF A&B&RSV AMP PRB: CPT | Performed by: FAMILY MEDICINE

## 2023-11-17 PROCEDURE — 87651 STREP A DNA AMP PROBE: CPT | Performed by: FAMILY MEDICINE

## 2023-11-17 ASSESSMENT — ANXIETY QUESTIONNAIRES
3. WORRYING TOO MUCH ABOUT DIFFERENT THINGS: SEVERAL DAYS
GAD7 TOTAL SCORE: 3
IF YOU CHECKED OFF ANY PROBLEMS ON THIS QUESTIONNAIRE, HOW DIFFICULT HAVE THESE PROBLEMS MADE IT FOR YOU TO DO YOUR WORK, TAKE CARE OF THINGS AT HOME, OR GET ALONG WITH OTHER PEOPLE: SOMEWHAT DIFFICULT
GAD7 TOTAL SCORE: 3
1. FEELING NERVOUS, ANXIOUS, OR ON EDGE: SEVERAL DAYS
7. FEELING AFRAID AS IF SOMETHING AWFUL MIGHT HAPPEN: NOT AT ALL
6. BECOMING EASILY ANNOYED OR IRRITABLE: NOT AT ALL
5. BEING SO RESTLESS THAT IT IS HARD TO SIT STILL: NOT AT ALL
2. NOT BEING ABLE TO STOP OR CONTROL WORRYING: SEVERAL DAYS

## 2023-11-17 ASSESSMENT — PATIENT HEALTH QUESTIONNAIRE - PHQ9
SUM OF ALL RESPONSES TO PHQ QUESTIONS 1-9: 3
5. POOR APPETITE OR OVEREATING: NOT AT ALL

## 2023-11-17 NOTE — NURSING NOTE
"33 year old  Chief Complaint   Patient presents with    Pharyngitis     Pt reports as of this morning as severe sore throat. PT took a COVID test and tested negative. PT would like to double check with another COVID test or any other testing.        Blood pressure 121/87, pulse 91, temperature 99  F (37.2  C), temperature source Skin, resp. rate 15, SpO2 96%, not currently breastfeeding. There is no height or weight on file to calculate BMI.  Patient Active Problem List   Diagnosis    Anxiety and depression    IUD (intrauterine device) in place    ADD (attention deficit disorder)    Intractable headache       Wt Readings from Last 2 Encounters:   07/21/23 58.1 kg (128 lb)   06/29/23 58.1 kg (128 lb)     BP Readings from Last 3 Encounters:   11/17/23 121/87   07/21/23 112/79   07/11/23 120/82         Current Outpatient Medications   Medication    acetaminophen (TYLENOL) 325 MG tablet    buPROPion (WELLBUTRIN XL) 300 MG 24 hr tablet    lamoTRIgine (LAMICTAL) 150 MG tablet    levonorgestrel (KYLEENA) 19.5 MG IUD    Probiotic Product (PROBIOTIC-10 PO)     No current facility-administered medications for this visit.       Social History     Tobacco Use    Smoking status: Never    Smokeless tobacco: Never   Vaping Use    Vaping Use: Never used   Substance Use Topics    Alcohol use: Yes    Drug use: Never       Health Maintenance Due   Topic Date Due    PAP  Never done    INFLUENZA VACCINE (1) 09/01/2023    COVID-19 Vaccine (2 - 2023-24 season) 09/01/2023       No results found for: \"PAP\"      November 17, 2023 4:37 PM    "

## 2023-11-17 NOTE — PROGRESS NOTES
ASSESSMENT AND PLAN:     (J02.9) Pharyngitis, unspecified etiology  (primary encounter diagnosis)  Comment: Acute, uncomplicated.  Throat appearance could suggest very early herpangina or oral HSV prior to ulceration. Checking for strep and COVID today.   Reviewed supportive care measures and red flag symptoms that would warrant call back vs ED.  Plan: Streptococcus A Rapid Screen w/Reflex to PCR -         Clinic Collect, Symptomatic Influenza A/B, RSV,        & SARS-CoV2 PCR (COVID-19) Nasopharyngeal,         Group A Streptococcus PCR Throat Swab                    Uday Eaton MD   [facility]  11/17/2023, 8:01 PM      SUBJECTIVE:   Mariana is a 33 year old female who presents to clinic today for a return visit.      - fiance has had upper respiratory symptoms recently    - yesterday, Mariana had a mold exposure and has a history of mold allergy    - woke up this morning with a sore throat which has persisted  - some rhinorrhea but was also outside all day so isn't unexpected  - no real nasal congestion  - no ear pain  - no cough  - is able to swallow    - took some Tylenol a couple of times today    - recently had a UTI, treated with Bactrim, finished a week ago  - had a rash on amoxicillin as a baby    - had some spotting today      Patient Active Problem List   Diagnosis     Anxiety and depression     IUD (intrauterine device) in place     ADD (attention deficit disorder)     Intractable headache     Current Outpatient Medications   Medication     acetaminophen (TYLENOL) 325 MG tablet     buPROPion (WELLBUTRIN XL) 300 MG 24 hr tablet     lamoTRIgine (LAMICTAL) 150 MG tablet     levonorgestrel (KYLEENA) 19.5 MG IUD     Probiotic Product (PROBIOTIC-10 PO)     No current facility-administered medications for this visit.       I have reviewed the patient's relevant past medical history.     OBJECTIVE:   /87 (BP Location: Left arm, Patient Position: Sitting, Cuff Size: Adult Regular)    Pulse 91   Temp 99  F (37.2  C) (Skin)   Resp 15   SpO2 96%     Constitutional: well-appearing, appears stated age  Eyes: conjunctivae without erythema, sclera anicteric.   ENT: tonsils normal in size and no exudates. No trismus. Posterior oropharynx is erythematous and has multiple clustered red papules.   Neck: shotty bilateral cervical lymphadenopathy

## 2023-11-29 ENCOUNTER — TELEPHONE (OUTPATIENT)
Dept: FAMILY MEDICINE | Facility: CLINIC | Age: 33
End: 2023-11-29

## 2023-11-29 DIAGNOSIS — M24.9 HYPERMOBILITY OF JOINT: Primary | ICD-10-CM

## 2023-11-29 NOTE — TELEPHONE ENCOUNTER
Referral with Dr. Edmond's note and labs were faxed to [facility] at [phone].  Patient sent Greetz message with phone number to call Rheumatology to schedule an appointment.  LEVON VelazquezN, RN, [facility]  RN Care Coordinator  [facility]  11/29/23  10:26 AM  Phone: [phone]

## 2023-11-29 NOTE — TELEPHONE ENCOUNTER
----- Message from Mayela Edmond MD sent at 11/27/2023  6:53 PM CST -----  Regarding: needs referral to rheumatology --[facility] location  I saw this patient of Loco Italo's in July 2023. She was concerned for Giovanni Danlos disease.  I recommended she call her insurance to determine rheumatologists in her network as the [facility] rheum clinic is not taking patients.    I sent referral to PM&R clinic but they informed me they would not evaluate her without a diagnosis.     I since learned that [facility] has rheumatologists on staff.    Will you please refer her under Loco's name or ask him first about the referral?  Trying to keep things under one provider.    Also please inform the patient about the referral and that PM&R will not see her without a diagnosis.    Many thanks    Mayela

## 2024-01-15 ENCOUNTER — TRANSFERRED RECORDS (OUTPATIENT)
Dept: HEALTH INFORMATION MANAGEMENT | Facility: CLINIC | Age: 34
End: 2024-01-15
Payer: COMMERCIAL

## 2024-02-24 ENCOUNTER — OFFICE VISIT (OUTPATIENT)
Dept: URGENT CARE | Facility: URGENT CARE | Age: 34
End: 2024-02-24
Payer: COMMERCIAL

## 2024-02-24 VITALS
DIASTOLIC BLOOD PRESSURE: 85 MMHG | TEMPERATURE: 98.5 F | OXYGEN SATURATION: 98 % | HEART RATE: 97 BPM | SYSTOLIC BLOOD PRESSURE: 114 MMHG

## 2024-02-24 DIAGNOSIS — L23.89 ALLERGIC CONTACT DERMATITIS DUE TO OTHER AGENTS: Primary | ICD-10-CM

## 2024-02-24 DIAGNOSIS — R30.0 DYSURIA: ICD-10-CM

## 2024-02-24 DIAGNOSIS — N89.8 VAGINAL DISCHARGE: ICD-10-CM

## 2024-02-24 LAB
ALBUMIN UR-MCNC: NEGATIVE MG/DL
APPEARANCE UR: CLEAR
BILIRUB UR QL STRIP: NEGATIVE
CLUE CELLS: ABNORMAL
COLOR UR AUTO: YELLOW
GLUCOSE UR STRIP-MCNC: NEGATIVE MG/DL
HGB UR QL STRIP: NEGATIVE
KETONES UR STRIP-MCNC: NEGATIVE MG/DL
LEUKOCYTE ESTERASE UR QL STRIP: NEGATIVE
NITRATE UR QL: NEGATIVE
PH UR STRIP: 6.5 [PH] (ref 5–7)
SP GR UR STRIP: 1.01 (ref 1–1.03)
TRICHOMONAS, WET PREP: ABNORMAL
UROBILINOGEN UR STRIP-ACNC: 0.2 E.U./DL
WBC'S/HIGH POWER FIELD, WET PREP: ABNORMAL
YEAST, WET PREP: ABNORMAL

## 2024-02-24 PROCEDURE — 87210 SMEAR WET MOUNT SALINE/INK: CPT | Performed by: FAMILY MEDICINE

## 2024-02-24 PROCEDURE — 99214 OFFICE O/P EST MOD 30 MIN: CPT | Performed by: FAMILY MEDICINE

## 2024-02-24 PROCEDURE — 87086 URINE CULTURE/COLONY COUNT: CPT | Performed by: FAMILY MEDICINE

## 2024-02-24 PROCEDURE — 81003 URINALYSIS AUTO W/O SCOPE: CPT | Performed by: FAMILY MEDICINE

## 2024-02-24 RX ORDER — TRIAMCINOLONE ACETONIDE 1 MG/G
CREAM TOPICAL 2 TIMES DAILY
Qty: 45 G | Refills: 0 | Status: SHIPPED | OUTPATIENT
Start: 2024-02-24

## 2024-02-24 NOTE — PROGRESS NOTES
ASSESSMENT/PLAN:      ICD-10-CM    1. Allergic contact dermatitis due to other agents  L23.89 triamcinolone (KENALOG) 0.1 % external cream    Secondary to hair conditioner used to shave her genital area tissue tender, swelling/erythema,mild rash bilateral labia majora in area of shaved  pubic hair      2. Dysuria  R30.0 UA Macroscopic with reflex to Microscopic and Culture - Clinic Collect     Urine Culture Aerobic Bacterial - lab collect     Urine Culture Aerobic Bacterial - lab collect    Neg UA, pt hx of frequent UTI on Bactrim prophlaxis prn UTI symptoms.took Bactrim today.Urine sent for cx,did not start abx,      3. Vaginal discharge  N89.8 Wet prep - Clinic Collect    Negative wet prep          Patient Instructions     Apply triamcinolone cream to the areas of the vagina that are itching in A.M. and Bedtime for 7 days    Wash area with a separate wash cloth using unscented Dove soap to prevent worsening of the itching     No intercourse for 7 days    If your urine culture is positive, you will need to be started on a antibiotic-please call the nurse line with any questions        Return to clinic or to ER if symptoms worsen     Follow up with your primary care provider or clinic in about 2-3 days  if your symptoms do not improve          Reviewed medication instructions and side effects. Follow up if experiences side effects.     I reviewed supportive care, otc meds to use if needed, expected course, and signs of concern.  Follow up as needed or if she does not improve within  1-2 days or if worsens in any way.  Reviewed red flag symptoms and is to go to the ER if experiences any of these.     The use of Dragon/Teklatech dictation services may have been used to construct the content in this note; any grammatical or spelling errors are non-intentional. Please contact the author of this note directly if you are in need of any clarification.      On the day of the encounter, time spend on chart review, patient  visit, review of testing, documentation was 35 minutes              Patient presents with:  Urinary Problem: Pt reports shaving with conditioner to vaginal area 3 days ago. Now painful vaginal area       Subjective     Mariana Olguin is a 34 year old female who presents to clinic today for the following health issues:    HPI     URINARY TRACT SYMPTOMS/vaginal itching    Duration: 3 days, symptoms worse today   Description  dysuria  Intensity:  moderate  Accompanying signs and symptoms:  Fever/chills: no   Flank pain no   Nausea and vomiting: no   Vaginal symptoms: itching and recent use of hair conditioner to shave genital area  Abdominal/Pelvic Pain: no   History  History of frequent UTI's: YES- give Bactrim by her GYN provider to take at onset of UTI symptoms  History of kidney stones: no   Sexually Active: YES- pain with intercourse after shaving genital area   Precipitating or alleviating factors: as noted   Therapies tried and outcome: ibuprofen and one dose of Bactrim today   Outcome: no improvement in dysuria       Past Medical History:   Diagnosis Date    Anxiety and depression 3/19/2021    IUD (intrauterine device) in place 3/19/2021    Placed in 2020     Social History     Tobacco Use    Smoking status: Never    Smokeless tobacco: Never   Substance Use Topics    Alcohol use: Yes       Current Outpatient Medications   Medication Sig Dispense Refill    acetaminophen (TYLENOL) 325 MG tablet Take 325-650 mg by mouth every 6 hours as needed for mild pain PRN      buPROPion (WELLBUTRIN XL) 300 MG 24 hr tablet Take 300 mg by mouth every morning      lamoTRIgine (LAMICTAL) 150 MG tablet Take 150 mg by mouth daily      levonorgestrel (KYLEENA) 19.5 MG IUD Kyleena 17.5 mcg/24 hrs (5yrs) 19.5mg intrauterine device   Take by intrauterine route.      Probiotic Product (PROBIOTIC-10 PO)       triamcinolone (KENALOG) 0.1 % external cream Apply topically 2 times daily 45 g 0     Allergies   Allergen Reactions     Amoxicillin      Rash when child      Dust Mites     Mold     Penicillins              ROS are negative, except as otherwise noted HPI      Objective    /85   Pulse 97   Temp 98.5  F (36.9  C) (Tympanic)   SpO2 98%   There is no height or weight on file to calculate BMI.  Physical Exam   GENERAL: alert and mild distress  ABDOMEN: soft, nontender,  and bowel sounds normal, no CVAT bilateral   (female) external genitalia-swelling erythema, tender introitus and lower inner mucosa of labia minora and labia majora bilateral, pale pink pinpoint follicular rash in areas of shave pubic hair soft tissue along base of labia majora bilateral, normal urethral meatus, no discharge  NEURO: Normal strength and tone, mentation intact and speech normal, normal gait      Diagnostic Test Results:  Labs reviewed in Epic  Results for orders placed or performed in visit on 02/24/24   UA Macroscopic with reflex to Microscopic and Culture - Clinic Collect     Status: Normal    Specimen: Urine, Midstream   Result Value Ref Range    Color Urine Yellow Colorless, Straw, Light Yellow, Yellow    Appearance Urine Clear Clear    Glucose Urine Negative Negative mg/dL    Bilirubin Urine Negative Negative    Ketones Urine Negative Negative mg/dL    Specific Gravity Urine 1.010 1.003 - 1.035    Blood Urine Negative Negative    pH Urine 6.5 5.0 - 7.0    Protein Albumin Urine Negative Negative mg/dL    Urobilinogen Urine 0.2 0.2, 1.0 E.U./dL    Nitrite Urine Negative Negative    Leukocyte Esterase Urine Negative Negative    Narrative    Microscopic not indicated   Wet prep - Clinic Collect     Status: Abnormal    Specimen: Vagina; Swab   Result Value Ref Range    Trichomonas Absent Absent    Yeast Absent Absent    Clue Cells Absent Absent    WBCs/high power field 1+ (A) None

## 2024-02-24 NOTE — PATIENT INSTRUCTIONS
Apply triamcinolone cream to the areas of the vagina that are itching in A.M. and Bedtime for 7 days    Wash area with a separate wash cloth using unscented Dove soap to prevent worsening of the itching     No intercourse for 7 days    If your urine culture is positive, you will need to be started on a antibiotic-please call the nurse line with any questions        Return to clinic or to ER if symptoms worsen     Follow up with your primary care provider or clinic in about 2-3 days  if your symptoms do not improve

## 2024-02-25 LAB — BACTERIA UR CULT: NO GROWTH

## 2024-08-04 ENCOUNTER — HEALTH MAINTENANCE LETTER (OUTPATIENT)
Age: 34
End: 2024-08-04

## 2024-10-24 ENCOUNTER — TELEPHONE (OUTPATIENT)
Dept: FAMILY MEDICINE | Facility: CLINIC | Age: 34
End: 2024-10-24

## 2024-10-24 NOTE — TELEPHONE ENCOUNTER
"Mariana calling regarding what she suspects are bug bites that are \"mainly in groups of two\". She noticed several red welts develop on her right arm and hand. She returned from [place] a week ago after having stayed at an airport hotel. She reports that initially they were not painful or itchy. Very low suspicion for bed begs based on her description. However, the last couple of days they have been unbearably itchy, swollen, and red. She reports she was positive for Covid once she returned home as well so she hasn't been able to go to urgent care. Denies any systemic allergic response including racing heart, anaphylaxis, trouble breathing, etc. Discussed symptom management with oral antihistamines and cool compresses PRN. Advised urgent care follow up this weekend once isolation complete if necessary for worsening symptoms.    Madi PAZ, RN  10/24/24 11:21 AM    "

## 2025-01-14 ENCOUNTER — TELEPHONE (OUTPATIENT)
Dept: FAMILY MEDICINE | Facility: CLINIC | Age: 35
End: 2025-01-14

## 2025-01-14 DIAGNOSIS — E78.00 ELEVATED LDL CHOLESTEROL LEVEL: Primary | ICD-10-CM

## 2025-01-14 NOTE — TELEPHONE ENCOUNTER
General (use ONLY if request does not fit into other dot phrase categories)    Who is calling - Mariana   Reason for call - Requesting labs prior to the appointment.   Action desired - Would like to make lab appointment due to having to take medication which affects blood sugar labs.   Return call needed? Yes  Advised patient that response may take up to 1-2 business days? Yes  Ok to leave a message on VM? Yes

## 2025-01-15 NOTE — TELEPHONE ENCOUNTER
Unclear what labs patient is requesting. Blood sugars look good. I don't see a need to repeat that. Lipids were mildly elevated so will send an order for that. If patient is wanting a specific lab drawn she will nee to request it.     Mariana was seen today for lab request.    Diagnoses and all orders for this visit:    Elevated LDL cholesterol level  -     Lipid Profile; Future      Loco Puckett MD  3:25 PM, January 15, 2025

## 2025-01-21 ENCOUNTER — OFFICE VISIT (OUTPATIENT)
Dept: FAMILY MEDICINE | Facility: CLINIC | Age: 35
End: 2025-01-21
Payer: COMMERCIAL

## 2025-01-21 VITALS
WEIGHT: 134 LBS | TEMPERATURE: 97.2 F | OXYGEN SATURATION: 100 % | HEART RATE: 80 BPM | HEIGHT: 66 IN | RESPIRATION RATE: 16 BRPM | DIASTOLIC BLOOD PRESSURE: 71 MMHG | BODY MASS INDEX: 21.53 KG/M2 | SYSTOLIC BLOOD PRESSURE: 119 MMHG

## 2025-01-21 DIAGNOSIS — Z00.00 WELLNESS EXAMINATION: Primary | ICD-10-CM

## 2025-01-21 DIAGNOSIS — Z13.228 SCREENING FOR METABOLIC DISORDER: ICD-10-CM

## 2025-01-21 DIAGNOSIS — R79.89 ABNORMAL CBC: ICD-10-CM

## 2025-01-21 DIAGNOSIS — J32.9 SINUSITIS, UNSPECIFIED CHRONICITY, UNSPECIFIED LOCATION: ICD-10-CM

## 2025-01-21 DIAGNOSIS — Z13.1 SCREENING FOR DIABETES MELLITUS: ICD-10-CM

## 2025-01-21 DIAGNOSIS — E78.00 ELEVATED LDL CHOLESTEROL LEVEL: ICD-10-CM

## 2025-01-21 LAB
BASO+EOS+MONOS # BLD AUTO: 0.8 10E3/UL (ref 0–2.2)
BASO+EOS+MONOS NFR BLD AUTO: 7 %
ERYTHROCYTE [DISTWIDTH] IN BLOOD BY AUTOMATED COUNT: 12.8 % (ref 10–15)
EST. AVERAGE GLUCOSE BLD GHB EST-MCNC: 100 MG/DL
HBA1C MFR BLD: 5.1 % (ref 0–5.6)
HCT VFR BLD AUTO: 43.8 % (ref 35–47)
HGB BLD-MCNC: 14.7 G/DL (ref 11.7–15.7)
LYMPHOCYTES # BLD AUTO: 3 10E3/UL (ref 0.8–5.3)
LYMPHOCYTES NFR BLD AUTO: 26 %
MCH RBC QN AUTO: 32 PG (ref 26.5–33)
MCHC RBC AUTO-ENTMCNC: 33.6 G/DL (ref 31.5–36.5)
MCV RBC AUTO: 95 FL (ref 78–100)
NEUTROPHILS # BLD AUTO: 8.1 10E3/UL (ref 1.6–8.3)
NEUTROPHILS NFR BLD AUTO: 68 %
PLATELET # BLD AUTO: 344 10E3/UL (ref 150–450)
RBC # BLD AUTO: 4.6 10E6/UL (ref 3.8–5.2)
WBC # BLD AUTO: 11.9 10E3/UL (ref 4–11)

## 2025-01-21 PROCEDURE — 80048 BASIC METABOLIC PNL TOTAL CA: CPT | Performed by: FAMILY MEDICINE

## 2025-01-21 PROCEDURE — 83718 ASSAY OF LIPOPROTEIN: CPT | Performed by: FAMILY MEDICINE

## 2025-01-21 RX ORDER — DOXYCYCLINE 100 MG/1
100 CAPSULE ORAL 2 TIMES DAILY
Qty: 14 CAPSULE | Refills: 0 | Status: SHIPPED | OUTPATIENT
Start: 2025-01-21

## 2025-01-21 SDOH — HEALTH STABILITY: PHYSICAL HEALTH: ON AVERAGE, HOW MANY DAYS PER WEEK DO YOU ENGAGE IN MODERATE TO STRENUOUS EXERCISE (LIKE A BRISK WALK)?: 3 DAYS

## 2025-01-21 ASSESSMENT — SOCIAL DETERMINANTS OF HEALTH (SDOH): HOW OFTEN DO YOU GET TOGETHER WITH FRIENDS OR RELATIVES?: TWICE A WEEK

## 2025-01-21 NOTE — NURSING NOTE
"Mariana  35 year old    Chief Complaint   Patient presents with    Physical    LAB REQUEST     Would like to check for vitamin deficiencies - changes to diet and GI issues    Follow Up     Seen for sinusitis on 01/10/25 - finished abx but still feeling ear pressure and post-nasal drainage            Blood pressure 119/71, pulse 80, temperature 97.2  F (36.2  C), temperature source Skin, resp. rate 16, height 1.681 m (5' 6.18\"), weight 60.8 kg (134 lb), last menstrual period 01/01/2025, SpO2 100%, not currently breastfeeding. Body mass index is 21.51 kg/m .    Patient Active Problem List   Diagnosis    Anxiety and depression    IUD (intrauterine device) in place    ADD (attention deficit disorder)    Intractable headache              Wt Readings from Last 2 Encounters:   01/21/25 60.8 kg (134 lb)   01/10/25 58.6 kg (129 lb 4 oz)       BP Readings from Last 3 Encounters:   01/21/25 119/71   01/10/25 116/88   02/24/24 114/85                Current Outpatient Medications   Medication Sig Dispense Refill    acetaminophen (TYLENOL) 325 MG tablet Take 325-650 mg by mouth every 6 hours as needed for mild pain PRN      buPROPion (WELLBUTRIN XL) 300 MG 24 hr tablet Take 300 mg by mouth every morning      lamoTRIgine (LAMICTAL) 150 MG tablet Take 150 mg by mouth daily      levonorgestrel (KYLEENA) 19.5 MG IUD Kyleena 17.5 mcg/24 hrs (5yrs) 19.5mg intrauterine device   Take by intrauterine route.      Probiotic Product (PROBIOTIC-10 PO)       rizatriptan (MAXALT-MLT) 5 MG ODT Take 1 tablet (5 mg) by mouth at onset of headache for migraine. 9 tablet 2    triamcinolone (KENALOG) 0.1 % external cream Apply topically 2 times daily (Patient not taking: Reported on 1/21/2025) 45 g 0     No current facility-administered medications for this visit.              Social History     Tobacco Use    Smoking status: Never    Smokeless tobacco: Never   Vaping Use    Vaping status: Never Used   Substance Use Topics    Alcohol use: Yes     " "Comment: Rarely    Drug use: Never              Health Maintenance Due   Topic Date Due    PAP  08/21/2023    YEARLY PREVENTIVE VISIT  06/29/2024    INFLUENZA VACCINE (1) 09/01/2024    COVID-19 Vaccine (2 - 2024-25 season) 09/01/2024    PHQ-2 (once per calendar year)  01/01/2025            No results found for: \"PAP\"           January 21, 2025 1:41 PM    "

## 2025-01-21 NOTE — PROGRESS NOTES
Preventive Care Visit  [facility]  Loco Puckett MD, Family Medicine  Jan 21, 2025      Assessment & Plan   Problem List Items Addressed This Visit    None  Visit Diagnoses       Wellness examination    -  Primary    Elevated LDL cholesterol level        Relevant Orders    Lipid Profile    Sinusitis, unspecified chronicity, unspecified location        Relevant Medications    doxycycline hyclate (VIBRAMYCIN) 100 MG capsule    Screening for metabolic disorder        Relevant Orders    Basic metabolic panel    Screening for diabetes mellitus        Relevant Orders    Hemoglobin A1c (Completed)    Abnormal CBC              Basic labs as noted. Will follow up as indicated.     Agreed to refill of ABX as insurance against recurrent sinus pressure headaches. Mariana can start this medication if symptoms continue to worsen. I did caution her that repeat ABX may exacerbate GI symptoms.           Counseling  Appropriate preventive services were addressed with this patient via screening, questionnaire, or discussion as appropriate for fall prevention, nutrition, physical activity, Tobacco-use cessation, social engagement, weight loss and cognition.  Checklist reviewing preventive services available has been given to the patient.  Reviewed patient's diet, addressing concerns and/or questions.   She is at risk for lack of exercise and has been provided with information to increase physical activity for the benefit of her well-being.   The patient was instructed to see the dentist every 6 months.   She is at risk for psychosocial distress and has been provided with information to reduce risk.     Loco Puckett MD  2:38 PM, January 21, 2025        Subjective   Mariana is a 35 year old, presenting for the following:  Physical, LAB REQUEST (Would like to check for vitamin deficiencies - changes to diet and GI issues), and Follow Up (Seen for sinusitis on 01/10/25 - finished abx but still feeling ear pressure and post-nasal  "drainage)         [facility]  # Health Maintenance  - BP:   BP Readings from Last 3 Encounters:   01/21/25 119/71   01/10/25 116/88   02/24/24 114/85   - Cholesterol: pending  Recent Labs   Lab Test 06/29/23  0850   CHOL 204*   HDL 60   *   TRIG 60   The ASCVD Risk score (Winnie WEIR, et al., 2019) failed to calculate for the following reasons:    The 2019 ASCVD risk score is only valid for ages 40 to 79  - Diabetes Screening: pending  - Lung Cancer Screening: not indicated  55-79yo w/30py smoking history and currently smoking OR quit within past 15 years:  Low dose CT annually and discontinued once a person has been 15 years tobacco free  - (+) seatbelt use, (+) helmet, (+) smoke detector  - Feels safe at home, denies verbal/physical/emotional abuse in past year: yes  - Last Pap: manages this with OBGYN    Recent headaches  - seen by Dr. Brito for this recently  - was started on ABX for suspected sinus infection  - stopped ABX early when symptoms improved  - but now fears symptoms may be returning    Diet  - significant improvement in her diet since last physical   - at that point was eating \"chicken and bagels\" because she couldn't tolerate anything else  - now she has a much more diverse diet   - see concerns for vitamin deficiencies noted above  - wonders if she needs to take a daily vitamin supplement    Health Care Directive  Patient does not have a Health Care Directive        1/21/2025   General Health   How would you rate your overall physical health? (!) FAIR         1/21/2025   Nutrition   Three or more servings of calcium each day? Yes   Diet: Other   If other, please elaborate: I eat based on how my stomach is feeling. Also no processed sugar. I get multiple servings of veg/fruit/nuts every day.   How many servings of fruit and vegetables per day? (!) 2-3   How many sweetened beverages each day? 0-1         6/29/2023   Exercise   Frequency of exercise: 2-3 days/week            6/29/2023   Dental " "  Dentist two times every year? Yes         6/29/2023   Substance Use   Alcohol more than 3/day or more than 7/wk No     Social History     Tobacco Use    Smoking status: Never    Smokeless tobacco: Never   Vaping Use    Vaping status: Never Used   Substance Use Topics    Alcohol use: Yes     Comment: Rarely    Drug use: Never       Past Medical History:   Diagnosis Date    Anxiety and depression 3/19/2021    IUD (intrauterine device) in place 3/19/2021    Placed in 2020     Past Surgical History:   Procedure Laterality Date    DENTAL SURGERY      [place] Teeth     Family History   Problem Relation Age of Onset    Hypertension Father     Memory loss Maternal Grandmother     Breast Cancer Paternal Grandmother     Alzheimer Disease Paternal Grandmother          Review of Systems  Constitutional, HEENT, cardiovascular, pulmonary, gi and gu systems are negative, except as otherwise noted.     Objective    Exam  /71 (BP Location: Left arm, Patient Position: Sitting, Cuff Size: Adult Regular)   Pulse 80   Temp 97.2  F (36.2  C) (Skin)   Resp 16   Ht 1.681 m (5' 6.18\")   Wt 60.8 kg (134 lb)   LMP 01/01/2025 (Exact Date)   SpO2 100%   BMI 21.51 kg/m     Estimated body mass index is 21.51 kg/m  as calculated from the following:    Height as of this encounter: 1.681 m (5' 6.18\").    Weight as of this encounter: 60.8 kg (134 lb).    Physical Exam  GENERAL: alert and no distress  NECK: no adenopathy, no asymmetry, masses, or scars  RESP: lungs clear to auscultation - no rales, rhonchi or wheezes  CV: regular rate and rhythm, normal S1 S2, no S3 or S4, no murmur, click or rub, no peripheral edema  ABDOMEN: soft, nontender, no hepatosplenomegaly, no masses and bowel sounds normal  MS: no gross musculoskeletal defects noted, no edema        Signed Electronically by: Loco Puckett MD    "

## 2025-01-22 LAB
ANION GAP SERPL CALCULATED.3IONS-SCNC: 10 MMOL/L (ref 7–15)
BUN SERPL-MCNC: 8.7 MG/DL (ref 6–20)
CALCIUM SERPL-MCNC: 9 MG/DL (ref 8.8–10.4)
CHLORIDE SERPL-SCNC: 100 MMOL/L (ref 98–107)
CHOLEST SERPL-MCNC: 229 MG/DL
CREAT SERPL-MCNC: 0.73 MG/DL (ref 0.51–0.95)
EGFRCR SERPLBLD CKD-EPI 2021: >90 ML/MIN/1.73M2
FASTING STATUS PATIENT QL REPORTED: NO
FASTING STATUS PATIENT QL REPORTED: NO
GLUCOSE SERPL-MCNC: 97 MG/DL (ref 70–99)
HCO3 SERPL-SCNC: 24 MMOL/L (ref 22–29)
HDLC SERPL-MCNC: 64 MG/DL
LDLC SERPL CALC-MCNC: 152 MG/DL
NONHDLC SERPL-MCNC: 165 MG/DL
POTASSIUM SERPL-SCNC: 4 MMOL/L (ref 3.4–5.3)
SODIUM SERPL-SCNC: 134 MMOL/L (ref 135–145)
TRIGL SERPL-MCNC: 67 MG/DL

## 2025-02-09 NOTE — PROGRESS NOTES
Assessment & Plan   Mariana Olguin is a 35 year old year old female with a history of anxiety, depression and ADD who presents to clinic today with recently partially treated UTI, bactrim triggered nausea and severe abdominal cramps.  In addition - some neck pain since recent sinusitis.     Dysuria  Recent UTI, symptoms have improved.  Approx 5-6 doses of bactrim - which may be sufficient treatment.  Patient very sensitive to antibiotics - having nausea and severe cramps with bactrim.  As symptoms have improved - reasonable get culture before trying additional antibiotics.  Push fluids and treat symptomatically as she usually does for mild UTI symptoms.  If symptoms worsened - start Macrobid.  Otherwise, I will reach out once culture is available   - Urinalysis Macroscopic; Future  - Urinalysis Macroscopic  - Urine Culture Aerobic Bacterial; Future  - Routine UA with microscopic - No culture; Future  - Urine Culture Aerobic Bacterial  - Routine UA with microscopic - No culture    Neck pain  Suspect some muscle strain after recent sinus infection and headaches  - trial of heat 20 min daily to sore areas and gentle stretching    Elevated LDL cholesterol level  Discussed that some people's liver's make too much cholesterol.  Given good dietary and exercise practices - this is most likely her. No other cardiovascular risks at this point.  Monitor every 1-2 years.    Anxiety about health  Supportive interview      Follow up in 1-2 months to check in about general health as I am new PCP for her.  Also should repeat CBC to ensure WBC returns to normal when she is not sick.              Subjective   Mariana is a 35 year old, presenting for the following health issues:  Gastrointestinal Problem (Stomach issues after UTI treatment (bactrim).)    HPI   Recent UTI  - had UTI last week.  Gets them relatively often  - has a stash of bactrim for UTI, and has been told to take 1 prn.  But uses it rarely.  Usually just hydrates  for a week - and it goes away  - last week had very abrupt severe onset of UTI symptoms, seen in UC.  Prescribed macbrobid - but didn't take it because she wasn't sure how her stomach would do with it and she has tolerated bactrim in the past  - instead she treated with bactrim.  Took 1 pill - 2 doses terrible nausea cramps.  Tried 1/2 pills for a few days.  Then 1 yesterday  - still some UTI symptoms, not nearly as bad.  Slight stinging while urinating.  No severe pelvic pain   - recently had doxycyline for 1 week for sinusitis - concerned about total antibiotic exposure and what it will do to her stomach    Recent sinus infection  - treated with doxycyline  - headaches are better  - still having some neck pain with movements or in certain positions   - found some squishy bumps of her neck - was worried but friend reassured her they could be lymph nodes.  They went away    Elevated cholesterol  - eats very healthy lots of fruits and veggies  - frustrated with elevated cholesterol   - no direct family hx of elevated cholesterol     Hx of extensive stomach symptoms   - for last 4 years   -  has had rheum labs ordered in past - RF, KATELYN, ESR, CRP done in 2023 - reassuring   - saw GI in system in 2021 - virtual visit - postprandial cramping and diarrhea.  At that point had had EGD and colonoscopy.  Some erythema of gastric mucosa.   - colon with ulcers in sigmoid colon - biopsies showed microscopic colitis.  Biopsies neg for celiac.  Plan was to continue PPI, check fecal calporectin, referral to GI dietician,     Health anxiety  - tends to worry about health issues, has low threshold for going to UC  - but the times she doesn't go in early, feels like there are often complications and they wonder why she didn't come in sooner   - has had a lot medically going on in the last few weeks- particularly with digestion - which makes her worry more about her health         Objective    /80 (BP Location: Right arm, Patient  Position: Sitting, Cuff Size: Adult Regular)   Pulse 92   Temp 98.5  F (36.9  C) (Skin)   Resp 16   LMP 01/01/2025 (Exact Date)   SpO2 98%   There is no height or weight on file to calculate BMI.  Physical Exam   GENERAL: alert and no distress  NECK: no adenopathy, no asymmetry, masses, or scars  MS: no gross musculoskeletal defects noted, no edema    Clinic UA Macro  - clear, yellow - trace leukocyte esterase         32  minutes spent on the date of the encounter doing chart review, history and exam, documentation and further activities per the note       Signed Electronically by: Ya Grande MD

## 2025-02-10 ENCOUNTER — OFFICE VISIT (OUTPATIENT)
Dept: FAMILY MEDICINE | Facility: CLINIC | Age: 35
End: 2025-02-10
Payer: COMMERCIAL

## 2025-02-10 VITALS
SYSTOLIC BLOOD PRESSURE: 112 MMHG | TEMPERATURE: 98.5 F | RESPIRATION RATE: 16 BRPM | HEART RATE: 92 BPM | OXYGEN SATURATION: 98 % | DIASTOLIC BLOOD PRESSURE: 80 MMHG

## 2025-02-10 DIAGNOSIS — E78.00 ELEVATED LDL CHOLESTEROL LEVEL: ICD-10-CM

## 2025-02-10 DIAGNOSIS — M54.2 NECK PAIN: ICD-10-CM

## 2025-02-10 DIAGNOSIS — R30.0 DYSURIA: Primary | ICD-10-CM

## 2025-02-10 DIAGNOSIS — R45.89 ANXIETY ABOUT HEALTH: ICD-10-CM

## 2025-02-10 LAB
ALBUMIN UR-MCNC: NEGATIVE MG/DL
ALBUMIN UR-MCNC: NEGATIVE MG/DL
APPEARANCE UR: CLEAR
APPEARANCE UR: CLEAR
BACTERIA #/AREA URNS HPF: ABNORMAL /HPF
BILIRUB UR QL STRIP: NEGATIVE
BILIRUB UR QL STRIP: NEGATIVE
COLOR UR AUTO: ABNORMAL
COLOR UR AUTO: YELLOW
GLUCOSE UR STRIP-MCNC: NEGATIVE MG/DL
GLUCOSE UR STRIP-MCNC: NEGATIVE MG/DL
HGB UR QL STRIP: NEGATIVE
HGB UR QL STRIP: NEGATIVE
KETONES UR STRIP-MCNC: NEGATIVE MG/DL
KETONES UR STRIP-MCNC: NEGATIVE MG/DL
LEUKOCYTE ESTERASE UR QL STRIP: ABNORMAL
LEUKOCYTE ESTERASE UR QL STRIP: ABNORMAL
NITRATE UR QL: NEGATIVE
NITRATE UR QL: NEGATIVE
PH UR STRIP: 6 [PH] (ref 5–7)
PH UR STRIP: 6 [PH] (ref 5–7)
RBC URINE: 0 /HPF
SP GR UR STRIP: 1 (ref 1–1.03)
SP GR UR STRIP: <=1.005 (ref 1–1.03)
SQUAMOUS EPITHELIAL: 1 /HPF
UROBILINOGEN UR STRIP-ACNC: 0.2 E.U./DL
UROBILINOGEN UR STRIP-MCNC: NORMAL MG/DL
WBC URINE: 1 /HPF

## 2025-02-10 PROCEDURE — 87086 URINE CULTURE/COLONY COUNT: CPT | Performed by: FAMILY MEDICINE

## 2025-02-10 PROCEDURE — 81001 URINALYSIS AUTO W/SCOPE: CPT | Performed by: FAMILY MEDICINE

## 2025-02-10 NOTE — NURSING NOTE
35 year old  Chief Complaint   Patient presents with    Gastrointestinal Problem     Stomach issues after UTI treatment (bactrim).       Blood pressure 112/80, pulse 92, temperature 98.5  F (36.9  C), temperature source Skin, resp. rate 16, last menstrual period 01/01/2025, SpO2 98%, not currently breastfeeding. There is no height or weight on file to calculate BMI.  Patient Active Problem List   Diagnosis    Anxiety and depression    IUD (intrauterine device) in place    ADD (attention deficit disorder)    Intractable headache       Wt Readings from Last 2 Encounters:   01/21/25 60.8 kg (134 lb)   01/10/25 58.6 kg (129 lb 4 oz)     BP Readings from Last 3 Encounters:   02/10/25 112/80   01/21/25 119/71   01/10/25 116/88         Current Outpatient Medications   Medication Sig Dispense Refill    acetaminophen (TYLENOL) 325 MG tablet Take 325-650 mg by mouth every 6 hours as needed for mild pain PRN      buPROPion (WELLBUTRIN XL) 300 MG 24 hr tablet Take 300 mg by mouth every morning      doxycycline hyclate (VIBRAMYCIN) 100 MG capsule Take 1 capsule (100 mg) by mouth 2 times daily. 14 capsule 0    lamoTRIgine (LAMICTAL) 150 MG tablet Take 150 mg by mouth daily      levonorgestrel (KYLEENA) 19.5 MG IUD Kyleena 17.5 mcg/24 hrs (5yrs) 19.5mg intrauterine device   Take by intrauterine route.      Probiotic Product (PROBIOTIC-10 PO)       rizatriptan (MAXALT-MLT) 5 MG ODT Take 1 tablet (5 mg) by mouth at onset of headache for migraine. 9 tablet 2     No current facility-administered medications for this visit.       Social History     Tobacco Use    Smoking status: Never    Smokeless tobacco: Never   Vaping Use    Vaping status: Never Used   Substance Use Topics    Alcohol use: Yes     Comment: Rarely    Drug use: Never       Health Maintenance Due   Topic Date Due    PAP  08/21/2023    INFLUENZA VACCINE (1) 09/01/2024    COVID-19 Vaccine (2 - 2024-25 season) 09/01/2024    PHQ-2 (once per calendar year)  01/01/2025  "      No results found for: \"PAP\"      February 10, 2025 1:07 PM   "

## 2025-02-10 NOTE — PATIENT INSTRUCTIONS
For the recent bladder infection  - the urine today looks so so - so you hydrate and we'll culture.  And I'll reach out when I have results and we'll decide what to do next       For the neck pain  - I think you have some muscle spasm left over from sinus infection.  Heat 20 min daily.  And gentle stretching       And then for cholesterol  - you're doing great with diet and exercise  - check it in 1-2 years

## 2025-02-12 LAB — BACTERIA UR CULT: NORMAL

## 2025-06-06 ENCOUNTER — LAB REQUISITION (OUTPATIENT)
Dept: LAB | Facility: CLINIC | Age: 35
End: 2025-06-06
Payer: COMMERCIAL

## 2025-06-06 DIAGNOSIS — Z12.4 ENCOUNTER FOR SCREENING FOR MALIGNANT NEOPLASM OF CERVIX: ICD-10-CM

## 2025-06-06 PROCEDURE — 87624 HPV HI-RISK TYP POOLED RSLT: CPT | Performed by: OBSTETRICS & GYNECOLOGY

## 2025-06-06 PROCEDURE — G0145 SCR C/V CYTO,THINLAYER,RESCR: HCPCS | Performed by: OBSTETRICS & GYNECOLOGY

## 2025-06-09 LAB
HPV HR 12 DNA CVX QL NAA+PROBE: NEGATIVE
HPV16 DNA CVX QL NAA+PROBE: NEGATIVE
HPV18 DNA CVX QL NAA+PROBE: NEGATIVE
HUMAN PAPILLOMA VIRUS FINAL DIAGNOSIS: NORMAL

## 2025-06-11 LAB
BKR AP ASSOCIATED HPV REPORT: NORMAL
BKR LAB AP GYN ADEQUACY: NORMAL
BKR LAB AP GYN INTERPRETATION: NORMAL
BKR LAB AP LMP: NORMAL
BKR LAB AP PREVIOUS ABNL DX: NORMAL
BKR LAB AP PREVIOUS ABNORMAL: NORMAL
PATH REPORT.COMMENTS IMP SPEC: NORMAL
PATH REPORT.COMMENTS IMP SPEC: NORMAL
PATH REPORT.RELEVANT HX SPEC: NORMAL

## 2025-06-12 ENCOUNTER — LAB (OUTPATIENT)
Dept: LAB | Facility: CLINIC | Age: 35
End: 2025-06-12
Payer: COMMERCIAL

## 2025-06-12 DIAGNOSIS — E78.00 ELEVATED LDL CHOLESTEROL LEVEL: ICD-10-CM

## 2025-06-12 LAB
CHOLEST SERPL-MCNC: 216 MG/DL
FASTING STATUS PATIENT QL REPORTED: YES
HDLC SERPL-MCNC: 57 MG/DL
LDLC SERPL CALC-MCNC: 148 MG/DL
NONHDLC SERPL-MCNC: 159 MG/DL
TRIGL SERPL-MCNC: 53 MG/DL

## 2025-06-13 ENCOUNTER — RESULTS FOLLOW-UP (OUTPATIENT)
Dept: FAMILY MEDICINE | Facility: CLINIC | Age: 35
End: 2025-06-13

## 2025-06-23 ENCOUNTER — OFFICE VISIT (OUTPATIENT)
Dept: FAMILY MEDICINE | Facility: CLINIC | Age: 35
End: 2025-06-23
Payer: COMMERCIAL

## 2025-06-23 VITALS
HEIGHT: 66 IN | SYSTOLIC BLOOD PRESSURE: 117 MMHG | DIASTOLIC BLOOD PRESSURE: 81 MMHG | BODY MASS INDEX: 20.9 KG/M2 | TEMPERATURE: 98.2 F | OXYGEN SATURATION: 98 % | WEIGHT: 130.04 LBS | HEART RATE: 113 BPM

## 2025-06-23 DIAGNOSIS — F41.9 ANXIETY AND DEPRESSION: ICD-10-CM

## 2025-06-23 DIAGNOSIS — F32.A ANXIETY AND DEPRESSION: ICD-10-CM

## 2025-06-23 DIAGNOSIS — M24.9 HYPERMOBILE JOINTS: ICD-10-CM

## 2025-06-23 DIAGNOSIS — R00.0 TACHYCARDIA: Primary | ICD-10-CM

## 2025-06-23 LAB
ERYTHROCYTE [DISTWIDTH] IN BLOOD BY AUTOMATED COUNT: 12.4 % (ref 10–15)
HCT VFR BLD AUTO: 42.5 % (ref 35–47)
HGB BLD-MCNC: 14.3 G/DL (ref 11.7–15.7)
MCH RBC QN AUTO: 31.5 PG (ref 26.5–33)
MCHC RBC AUTO-ENTMCNC: 33.6 G/DL (ref 31.5–36.5)
MCV RBC AUTO: 94 FL (ref 78–100)
PLATELET # BLD AUTO: 294 10E3/UL (ref 150–450)
RBC # BLD AUTO: 4.54 10E6/UL (ref 3.8–5.2)
WBC # BLD AUTO: 7.4 10E3/UL (ref 4–11)

## 2025-06-23 PROCEDURE — 84443 ASSAY THYROID STIM HORMONE: CPT | Performed by: FAMILY MEDICINE

## 2025-06-23 RX ORDER — TRETINOIN 0.025 %
CREAM (GRAM) TOPICAL
COMMUNITY

## 2025-06-23 NOTE — NURSING NOTE
"Mariana  35 year old    Chief Complaint   Patient presents with    Gastrointestinal Problem     Follow up             Blood pressure 127/89, pulse 104, temperature 98.2  F (36.8  C), height 1.685 m (5' 6.34\"), weight 59 kg (130 lb 0.6 oz), SpO2 97%, not currently breastfeeding. Body mass index is 20.78 kg/m .    Patient Active Problem List   Diagnosis    Anxiety and depression    IUD (intrauterine device) in place    ADD (attention deficit disorder)    Intractable headache    Elevated LDL cholesterol level    Anxiety about health             Wt Readings from Last 2 Encounters:   06/23/25 59 kg (130 lb 0.6 oz)   01/21/25 60.8 kg (134 lb)       BP Readings from Last 3 Encounters:   06/23/25 127/89   02/10/25 112/80   01/21/25 119/71                Current Outpatient Medications   Medication Sig Dispense Refill    buPROPion (WELLBUTRIN XL) 300 MG 24 hr tablet Take 300 mg by mouth every morning      lamoTRIgine (LAMICTAL) 150 MG tablet Take 150 mg by mouth daily      levonorgestrel (KYLEENA) 19.5 MG IUD Kyleena 17.5 mcg/24 hrs (5yrs) 19.5mg intrauterine device   Take by intrauterine route.      Probiotic Product (PROBIOTIC-10 PO)       acetaminophen (TYLENOL) 325 MG tablet Take 325-650 mg by mouth every 6 hours as needed for mild pain PRN (Patient not taking: Reported on 6/23/2025)      doxycycline hyclate (VIBRAMYCIN) 100 MG capsule Take 1 capsule (100 mg) by mouth 2 times daily. (Patient not taking: Reported on 6/23/2025) 14 capsule 0    RETIN-A 0.025 % external cream APPLY 1 TO 2 PEA-SIZED AMOUNT EXTERNALLY TO THE ENTIRE FACE AT BEDTIME. USE CONSISTENTLY      rizatriptan (MAXALT-MLT) 5 MG ODT Take 1 tablet (5 mg) by mouth at onset of headache for migraine. (Patient not taking: Reported on 6/23/2025) 9 tablet 2     No current facility-administered medications for this visit.             Social History     Tobacco Use    Smoking status: Never    Smokeless tobacco: Never   Vaping Use    Vaping status: Never Used " "  Substance Use Topics    Alcohol use: Yes     Comment: Rarely    Drug use: Never             Health Maintenance Due   Topic Date Due    COVID-19 VACCINE (2 - 2024-25 season) 09/01/2024    PHQ-2 (once per calendar year)  01/01/2025           No results found for: \"PAP\"           June 23, 2025 4:51 PM    "

## 2025-06-23 NOTE — PROGRESS NOTES
"  Assessment & Plan   Mariana Olguin is a 35 year old year old female with a history of anxiety, depression and ADD who presents to clinic today with new tachycardia.  At times driven by stress/anxiety -but sometimes high when at baseline.    Tachycardia  Labs today, will sent out Zio patch.  Agree with exercise and meditation   - CBC with platelets; Future  - TSH with free T4 reflex; Future  - ZIO PATCH MAIL OUT; Future  - CBC with platelets  - TSH with free T4 reflex    Hypermobile joints  I will look into anti-inflammatory supplements and get back to patient via MyChart     Anxiety and depression  Commend regular therapy            Subjective   Mariana is a 35 year old, presenting for the following health issues:  Gastrointestinal Problem (Follow up/)    HPI      Fast heart rate   - new in the last few months   - checked recently with bp cuff, 105   - feels fast heart beat  - no chest pain  - feels light headed sometimes   - at some point during the day, about every other day, sees stars  - that's very long standing   - a walk helps the fast heart beat   - meditation and deep breathing helps     Hypermobile  - doesn't fit syndromes  - but very flexible, easily gets bones out of place  - tends toward chronic aches and pains   - has done chiropractor and physical therapy   - has done acpucnture in the past, but limited number of visits per week.  Cannot be away from work frequently - has standing mental health appointments  - interested in anti-inflammatory supplements, can't take tumeric     Mental health  - has a hx of health anxiety   - in personal therapy weekly  - couples therapy weekly             Objective    /81   Pulse 113   Temp 98.2  F (36.8  C)   Ht 1.685 m (5' 6.34\")   Wt 59 kg (130 lb 0.6 oz)   SpO2 98%   BMI 20.78 kg/m    Body mass index is 20.78 kg/m .  Physical Exam   GENERAL: alert and no distress  RESP: lungs clear to auscultation - no rales, rhonchi or wheezes  CV: regular rate and " rhythm, normal S1 S2, no S3 or S4, no murmur, click or rub  MS: no gross musculoskeletal defects noted, no edema        Signed Electronically by: Ya Grande MD

## 2025-06-24 ENCOUNTER — RESULTS FOLLOW-UP (OUTPATIENT)
Dept: FAMILY MEDICINE | Facility: CLINIC | Age: 35
End: 2025-06-24

## 2025-06-24 LAB — TSH SERPL DL<=0.005 MIU/L-ACNC: 2.74 UIU/ML (ref 0.3–4.2)

## 2025-08-06 ENCOUNTER — NURSE TRIAGE (OUTPATIENT)
Dept: FAMILY MEDICINE | Facility: CLINIC | Age: 35
End: 2025-08-06

## 2025-08-06 ENCOUNTER — HOSPITAL ENCOUNTER (EMERGENCY)
Facility: CLINIC | Age: 35
Discharge: HOME | End: 2025-08-06
Attending: EMERGENCY MEDICINE | Admitting: EMERGENCY MEDICINE
Payer: COMMERCIAL

## 2025-08-06 VITALS
TEMPERATURE: 97.9 F | HEIGHT: 66 IN | SYSTOLIC BLOOD PRESSURE: 130 MMHG | DIASTOLIC BLOOD PRESSURE: 78 MMHG | BODY MASS INDEX: 20.89 KG/M2 | RESPIRATION RATE: 16 BRPM | WEIGHT: 130 LBS | OXYGEN SATURATION: 100 % | HEART RATE: 98 BPM

## 2025-08-06 VITALS — TEMPERATURE: 97.6 F | OXYGEN SATURATION: 98 % | DIASTOLIC BLOOD PRESSURE: 84 MMHG | SYSTOLIC BLOOD PRESSURE: 138 MMHG

## 2025-08-06 DIAGNOSIS — R55 NEAR SYNCOPE: Primary | ICD-10-CM

## 2025-08-06 LAB
ANION GAP SERPL CALCULATED.3IONS-SCNC: 8 MMOL/L (ref 7–15)
ATRIAL RATE - MUSE: 101 BPM
BASOPHILS # BLD AUTO: 0.1 10E3/UL (ref 0–0.2)
BASOPHILS NFR BLD AUTO: 1 %
BUN SERPL-MCNC: 8.1 MG/DL (ref 6–20)
CALCIUM SERPL-MCNC: 9.2 MG/DL (ref 8.8–10.4)
CHLORIDE SERPL-SCNC: 104 MMOL/L (ref 98–107)
CREAT SERPL-MCNC: 0.67 MG/DL (ref 0.51–0.95)
DIASTOLIC BLOOD PRESSURE - MUSE: NORMAL MMHG
EGFRCR SERPLBLD CKD-EPI 2021: >90 ML/MIN/1.73M2
EOSINOPHIL # BLD AUTO: 0.2 10E3/UL (ref 0–0.7)
EOSINOPHIL NFR BLD AUTO: 2 %
ERYTHROCYTE [DISTWIDTH] IN BLOOD BY AUTOMATED COUNT: 12.6 % (ref 10–15)
GLUCOSE SERPL-MCNC: 92 MG/DL (ref 70–99)
HCG SERPL QL: NEGATIVE
HCO3 SERPL-SCNC: 27 MMOL/L (ref 22–29)
HCT VFR BLD AUTO: 46 % (ref 35–47)
HGB BLD-MCNC: 15.9 G/DL (ref 11.7–15.7)
IMM GRANULOCYTES # BLD: 0.1 10E3/UL
IMM GRANULOCYTES NFR BLD: 1 %
INTERPRETATION ECG - MUSE: NORMAL
LYMPHOCYTES # BLD AUTO: 2.8 10E3/UL (ref 0.8–5.3)
LYMPHOCYTES NFR BLD AUTO: 27 %
MCH RBC QN AUTO: 32.4 PG (ref 26.5–33)
MCHC RBC AUTO-ENTMCNC: 34.6 G/DL (ref 31.5–36.5)
MCV RBC AUTO: 94 FL (ref 78–100)
MONOCYTES # BLD AUTO: 0.5 10E3/UL (ref 0–1.3)
MONOCYTES NFR BLD AUTO: 5 %
NEUTROPHILS # BLD AUTO: 6.8 10E3/UL (ref 1.6–8.3)
NEUTROPHILS NFR BLD AUTO: 65 %
NRBC # BLD AUTO: 0 10E3/UL
NRBC BLD AUTO-RTO: 0 /100
P AXIS - MUSE: 83 DEGREES
PLATELET # BLD AUTO: 369 10E3/UL (ref 150–450)
POTASSIUM SERPL-SCNC: 4.1 MMOL/L (ref 3.4–5.3)
PR INTERVAL - MUSE: 142 MS
QRS DURATION - MUSE: 80 MS
QT - MUSE: 352 MS
QTC - MUSE: 456 MS
R AXIS - MUSE: 97 DEGREES
RBC # BLD AUTO: 4.91 10E6/UL (ref 3.8–5.2)
SODIUM SERPL-SCNC: 139 MMOL/L (ref 135–145)
SYSTOLIC BLOOD PRESSURE - MUSE: NORMAL MMHG
T AXIS - MUSE: 47 DEGREES
VENTRICULAR RATE- MUSE: 101 BPM
WBC # BLD AUTO: 10.4 10E3/UL (ref 4–11)

## 2025-08-06 PROCEDURE — 80048 BASIC METABOLIC PNL TOTAL CA: CPT | Performed by: EMERGENCY MEDICINE

## 2025-08-06 PROCEDURE — 93005 ELECTROCARDIOGRAM TRACING: CPT

## 2025-08-06 PROCEDURE — 99284 EMERGENCY DEPT VISIT MOD MDM: CPT | Performed by: EMERGENCY MEDICINE

## 2025-08-06 PROCEDURE — 85004 AUTOMATED DIFF WBC COUNT: CPT | Performed by: EMERGENCY MEDICINE

## 2025-08-06 PROCEDURE — 84202 ASSAY RBC PROTOPORPHYRIN: CPT | Performed by: EMERGENCY MEDICINE

## 2025-08-06 PROCEDURE — 36415 COLL VENOUS BLD VENIPUNCTURE: CPT | Performed by: EMERGENCY MEDICINE

## 2025-08-06 PROCEDURE — 84703 CHORIONIC GONADOTROPIN ASSAY: CPT | Performed by: EMERGENCY MEDICINE

## 2025-08-06 ASSESSMENT — COLUMBIA-SUICIDE SEVERITY RATING SCALE - C-SSRS
2. HAVE YOU ACTUALLY HAD ANY THOUGHTS OF KILLING YOURSELF IN THE PAST MONTH?: NO
1. IN THE PAST MONTH, HAVE YOU WISHED YOU WERE DEAD OR WISHED YOU COULD GO TO SLEEP AND NOT WAKE UP?: NO
6. HAVE YOU EVER DONE ANYTHING, STARTED TO DO ANYTHING, OR PREPARED TO DO ANYTHING TO END YOUR LIFE?: NO

## 2025-08-06 ASSESSMENT — ACTIVITIES OF DAILY LIVING (ADL)
ADLS_ACUITY_SCORE: 41

## 2025-08-10 LAB
LEAD BLDV-MCNC: <2 UG/DL
ZPP RBC-MCNC: 19 UG/DL
ZPP RBC-SRTO: 33 UMOLZPP/MOLHEM

## 2025-08-14 ENCOUNTER — TELEPHONE (OUTPATIENT)
Dept: FAMILY MEDICINE | Facility: CLINIC | Age: 35
End: 2025-08-14